# Patient Record
Sex: FEMALE | Race: WHITE | NOT HISPANIC OR LATINO
[De-identification: names, ages, dates, MRNs, and addresses within clinical notes are randomized per-mention and may not be internally consistent; named-entity substitution may affect disease eponyms.]

---

## 2017-05-22 ENCOUNTER — APPOINTMENT (OUTPATIENT)
Dept: OPHTHALMOLOGY | Facility: CLINIC | Age: 42
End: 2017-05-22

## 2017-05-22 DIAGNOSIS — H35.371 PUCKERING OF MACULA, RIGHT EYE: ICD-10-CM

## 2017-05-22 DIAGNOSIS — Z86.69 PERSONAL HISTORY OF OTHER DISEASES OF THE NERVOUS SYSTEM AND SENSE ORGANS: ICD-10-CM

## 2017-05-22 DIAGNOSIS — H43.21 CRYSTALLINE DEPOSITS IN VITREOUS BODY, RIGHT EYE: ICD-10-CM

## 2017-05-23 RX ORDER — DIFLUPREDNATE 0.5 MG/ML
0.05 EMULSION OPHTHALMIC
Qty: 1 | Refills: 4 | Status: ACTIVE | COMMUNITY
Start: 2017-05-23 | End: 1900-01-01

## 2017-07-18 ENCOUNTER — EMERGENCY (EMERGENCY)
Facility: HOSPITAL | Age: 42
LOS: 1 days | Discharge: HOME | End: 2017-07-18
Attending: EMERGENCY MEDICINE
Payer: COMMERCIAL

## 2017-07-18 VITALS
TEMPERATURE: 98 F | OXYGEN SATURATION: 100 % | SYSTOLIC BLOOD PRESSURE: 125 MMHG | DIASTOLIC BLOOD PRESSURE: 73 MMHG | RESPIRATION RATE: 18 BRPM | HEART RATE: 52 BPM

## 2017-07-18 VITALS
OXYGEN SATURATION: 98 % | TEMPERATURE: 98 F | RESPIRATION RATE: 18 BRPM | DIASTOLIC BLOOD PRESSURE: 54 MMHG | HEART RATE: 74 BPM | SYSTOLIC BLOOD PRESSURE: 119 MMHG

## 2017-07-18 LAB
ALBUMIN SERPL ELPH-MCNC: 3.8 G/DL — SIGNIFICANT CHANGE UP (ref 3.3–5)
ALP SERPL-CCNC: 88 U/L — SIGNIFICANT CHANGE UP (ref 40–120)
ALT FLD-CCNC: 12 U/L — SIGNIFICANT CHANGE UP (ref 4–33)
AST SERPL-CCNC: 12 U/L — SIGNIFICANT CHANGE UP (ref 4–32)
BASOPHILS # BLD AUTO: 0.03 K/UL — SIGNIFICANT CHANGE UP (ref 0–0.2)
BASOPHILS NFR BLD AUTO: 0.3 % — SIGNIFICANT CHANGE UP (ref 0–2)
BILIRUB SERPL-MCNC: 0.2 MG/DL — SIGNIFICANT CHANGE UP (ref 0.2–1.2)
BUN SERPL-MCNC: 11 MG/DL — SIGNIFICANT CHANGE UP (ref 7–23)
CALCIUM SERPL-MCNC: 9 MG/DL — SIGNIFICANT CHANGE UP (ref 8.4–10.5)
CHLORIDE SERPL-SCNC: 103 MMOL/L — SIGNIFICANT CHANGE UP (ref 98–107)
CK MB BLD-MCNC: 2.11 NG/ML — SIGNIFICANT CHANGE UP (ref 1–4.7)
CK SERPL-CCNC: 123 U/L — SIGNIFICANT CHANGE UP (ref 25–170)
CO2 SERPL-SCNC: 27 MMOL/L — SIGNIFICANT CHANGE UP (ref 22–31)
CREAT SERPL-MCNC: 0.59 MG/DL — SIGNIFICANT CHANGE UP (ref 0.5–1.3)
EOSINOPHIL # BLD AUTO: 0.14 K/UL — SIGNIFICANT CHANGE UP (ref 0–0.5)
EOSINOPHIL NFR BLD AUTO: 1.4 % — SIGNIFICANT CHANGE UP (ref 0–6)
GLUCOSE SERPL-MCNC: 262 MG/DL — HIGH (ref 70–99)
HCT VFR BLD CALC: 38.3 % — SIGNIFICANT CHANGE UP (ref 34.5–45)
HGB BLD-MCNC: 13.1 G/DL — SIGNIFICANT CHANGE UP (ref 11.5–15.5)
IMM GRANULOCYTES # BLD AUTO: 0.04 # — SIGNIFICANT CHANGE UP
IMM GRANULOCYTES NFR BLD AUTO: 0.4 % — SIGNIFICANT CHANGE UP (ref 0–1.5)
LIDOCAIN IGE QN: 26.9 U/L — SIGNIFICANT CHANGE UP (ref 7–60)
LYMPHOCYTES # BLD AUTO: 2.94 K/UL — SIGNIFICANT CHANGE UP (ref 1–3.3)
LYMPHOCYTES # BLD AUTO: 28.7 % — SIGNIFICANT CHANGE UP (ref 13–44)
MAGNESIUM SERPL-MCNC: 2 MG/DL — SIGNIFICANT CHANGE UP (ref 1.6–2.6)
MCHC RBC-ENTMCNC: 31.3 PG — SIGNIFICANT CHANGE UP (ref 27–34)
MCHC RBC-ENTMCNC: 34.2 % — SIGNIFICANT CHANGE UP (ref 32–36)
MCV RBC AUTO: 91.4 FL — SIGNIFICANT CHANGE UP (ref 80–100)
MONOCYTES # BLD AUTO: 0.49 K/UL — SIGNIFICANT CHANGE UP (ref 0–0.9)
MONOCYTES NFR BLD AUTO: 4.8 % — SIGNIFICANT CHANGE UP (ref 2–14)
NEUTROPHILS # BLD AUTO: 6.62 K/UL — SIGNIFICANT CHANGE UP (ref 1.8–7.4)
NEUTROPHILS NFR BLD AUTO: 64.4 % — SIGNIFICANT CHANGE UP (ref 43–77)
NRBC # FLD: 0 — SIGNIFICANT CHANGE UP
PLATELET # BLD AUTO: 242 K/UL — SIGNIFICANT CHANGE UP (ref 150–400)
PMV BLD: 10.3 FL — SIGNIFICANT CHANGE UP (ref 7–13)
POTASSIUM SERPL-MCNC: 4.5 MMOL/L — SIGNIFICANT CHANGE UP (ref 3.5–5.3)
POTASSIUM SERPL-SCNC: 4.5 MMOL/L — SIGNIFICANT CHANGE UP (ref 3.5–5.3)
PROT SERPL-MCNC: 6.6 G/DL — SIGNIFICANT CHANGE UP (ref 6–8.3)
RBC # BLD: 4.19 M/UL — SIGNIFICANT CHANGE UP (ref 3.8–5.2)
RBC # FLD: 12.4 % — SIGNIFICANT CHANGE UP (ref 10.3–14.5)
SODIUM SERPL-SCNC: 142 MMOL/L — SIGNIFICANT CHANGE UP (ref 135–145)
TROPONIN T SERPL-MCNC: < 0.06 NG/ML — SIGNIFICANT CHANGE UP (ref 0–0.06)
TROPONIN T SERPL-MCNC: < 0.06 NG/ML — SIGNIFICANT CHANGE UP (ref 0–0.06)
WBC # BLD: 10.26 K/UL — SIGNIFICANT CHANGE UP (ref 3.8–10.5)
WBC # FLD AUTO: 10.26 K/UL — SIGNIFICANT CHANGE UP (ref 3.8–10.5)

## 2017-07-18 PROCEDURE — 99285 EMERGENCY DEPT VISIT HI MDM: CPT

## 2017-07-18 PROCEDURE — 71020: CPT | Mod: 26

## 2017-07-18 RX ORDER — LIDOCAINE 4 G/100G
10 CREAM TOPICAL ONCE
Qty: 0 | Refills: 0 | Status: COMPLETED | OUTPATIENT
Start: 2017-07-18 | End: 2017-07-18

## 2017-07-18 RX ORDER — FAMOTIDINE 10 MG/ML
20 INJECTION INTRAVENOUS ONCE
Qty: 0 | Refills: 0 | Status: COMPLETED | OUTPATIENT
Start: 2017-07-18 | End: 2017-07-18

## 2017-07-18 RX ORDER — PANTOPRAZOLE SODIUM 20 MG/1
40 TABLET, DELAYED RELEASE ORAL ONCE
Qty: 0 | Refills: 0 | Status: COMPLETED | OUTPATIENT
Start: 2017-07-18 | End: 2017-07-18

## 2017-07-18 RX ADMIN — PANTOPRAZOLE SODIUM 40 MILLIGRAM(S): 20 TABLET, DELAYED RELEASE ORAL at 18:01

## 2017-07-18 RX ADMIN — LIDOCAINE 10 MILLILITER(S): 4 CREAM TOPICAL at 18:01

## 2017-07-18 RX ADMIN — Medication 30 MILLILITER(S): at 18:01

## 2017-07-18 RX ADMIN — FAMOTIDINE 20 MILLIGRAM(S): 10 INJECTION INTRAVENOUS at 15:08

## 2017-07-18 NOTE — ED ADULT NURSE NOTE - OBJECTIVE STATEMENT
Alert and oriented x 4. Pt received to intake spot 10c due to chest discomfort since this morning that increases with miovement. Pt deneis shortness of breath, nausea, vomiting, dizziness or diarrhea. IV 20g to right AC. Labs drawn. VSS. Will continue to monitor.

## 2017-07-18 NOTE — ED ADULT TRIAGE NOTE - CHIEF COMPLAINT QUOTE
p/t c/o of chest discomfort since this am pain increased with movement denies any other discomfort nad noted

## 2017-07-18 NOTE — ED PROVIDER NOTE - ATTENDING CONTRIBUTION TO CARE
40 yo F w/ hx of DM c/o chest pain since 3am constant since. Pain woke her up from sleep, localized to the left side, radiating to the left shoulder, constant, exertional, associated with mild SOB. Pain was burning in nature this morning now changed to pressure like.  no vomiting, no diaphoresis, no palpitations, no leg swelling, no hx of dvt/pe, no family hx of cad, non-smoker, no drug use.  pt stopped taking ppi since she stated was treated with h. pylori and told is gone now.  Pt also had a routine stress test which was normal last yr.  no trauma.    exam well appearing female in nad, hemodynamically stable.    will obtain labs, cxr, ekg, repeat trop and ekg, gi cocktail and po challenge.  likely has gastritis low cardiac risk.     pt eating in ed.  no active chest pain.  trops x 2 neg,  ekg repeat also unchanged sinus bradycardia at 55-59.  normal QTc 417 no hyperacute t wave, no st depression.  HEART score 2.  Low risk for acute ACS. cxr no cardiomegaly.  no consolidation or ptx.  PERC NEG   pt likely has worsening gastritis.  f/u with pcp, gi and cardio for outpt cardiac workup and gi for gastritis.  rec to take pepcid.  left ambulatory with family.

## 2017-07-18 NOTE — ED ADULT NURSE REASSESSMENT NOTE - NS ED NURSE REASSESS COMMENT FT1
Pt d/catherine home, with instruction by MD Cole.  Verbalized understanding.  Repeat troponin negative.  EKG-no change.  IV removed.  Left stable.

## 2017-07-18 NOTE — ED PROVIDER NOTE - OBJECTIVE STATEMENT
42 yo F w/ hx of DM c/o chest pain since 3am. Pain woke her up from sleep, localized to the left side, radiating to the left shoulder, constant, exertional, associated with mild SOB. Pain was burning in nature this morning, but feel more like a pressure now. Pt has past hx of GERD but not on medication at the moment. Denies any fever, chills, nausea, vomiting, coughing, recent travel or surgery, no leg swelling, no hx of DVT/PE. Pt has family hx of CAD. Denies hx of smoking. Pt was brought by ambulance, already got aspirin

## 2017-07-18 NOTE — ED PROVIDER NOTE - PROGRESS NOTE DETAILS
guillory: pt eating in ed.  no active chest pain.  trops x 2 neg,  ekg repeat also unchanged sinus bradycardia at 55-59.  normal QTc 417 no hyperacute t wave, no st depression.  HEART score 2.  Low risk for acute ACS. cxr no cardiomegaly.  no consolidation or ptx.  PERC NEG   pt likely has worsening gastritis.  f/u with pcp, gi and cardio for outpt cardiac workup and gi for gastritis.  rec to take pepcid.  left ambulatory with family

## 2017-07-18 NOTE — ED PROVIDER NOTE - MEDICAL DECISION MAKING DETAILS
42 yo F w/ hx of DM c/o chest pain since 3am. heart score is 3, PERC 0  - labs, chest x-ray, EKG 40 yo F w/ hx of DM c/o chest pain since 3am. heart score is 2, PERC 0  - labs, chest x-ray, EKG

## 2017-11-29 ENCOUNTER — OUTPATIENT (OUTPATIENT)
Dept: OUTPATIENT SERVICES | Facility: HOSPITAL | Age: 42
LOS: 1 days | End: 2017-11-29
Payer: COMMERCIAL

## 2017-11-29 ENCOUNTER — RESULT REVIEW (OUTPATIENT)
Age: 42
End: 2017-11-29

## 2017-11-29 ENCOUNTER — TRANSCRIPTION ENCOUNTER (OUTPATIENT)
Age: 42
End: 2017-11-29

## 2017-11-29 VITALS
HEART RATE: 99 BPM | DIASTOLIC BLOOD PRESSURE: 47 MMHG | RESPIRATION RATE: 12 BRPM | OXYGEN SATURATION: 97 % | TEMPERATURE: 99 F | HEIGHT: 66 IN | WEIGHT: 185.63 LBS | SYSTOLIC BLOOD PRESSURE: 108 MMHG

## 2017-11-29 VITALS
DIASTOLIC BLOOD PRESSURE: 58 MMHG | HEART RATE: 99 BPM | SYSTOLIC BLOOD PRESSURE: 100 MMHG | OXYGEN SATURATION: 97 % | RESPIRATION RATE: 16 BRPM

## 2017-11-29 DIAGNOSIS — B58.01 TOXOPLASMA CHORIORETINITIS: ICD-10-CM

## 2017-11-29 DIAGNOSIS — Z98.41 CATARACT EXTRACTION STATUS, RIGHT EYE: Chronic | ICD-10-CM

## 2017-11-29 LAB
GLUCOSE BLDC GLUCOMTR-MCNC: 288 MG/DL — HIGH (ref 70–99)
GRAM STN FLD: SIGNIFICANT CHANGE UP
HCG UR QL: NEGATIVE — SIGNIFICANT CHANGE UP
LABORATORY COMMENT REPORT: SIGNIFICANT CHANGE UP
SOURCE HSV 1/2: SIGNIFICANT CHANGE UP
SPECIMEN SOURCE: SIGNIFICANT CHANGE UP

## 2017-11-29 PROCEDURE — 87799 DETECT AGENT NOS DNA QUANT: CPT

## 2017-11-29 PROCEDURE — 87070 CULTURE OTHR SPECIMN AEROBIC: CPT

## 2017-11-29 PROCEDURE — 36415 COLL VENOUS BLD VENIPUNCTURE: CPT

## 2017-11-29 PROCEDURE — 87205 SMEAR GRAM STAIN: CPT

## 2017-11-29 PROCEDURE — 88305 TISSUE EXAM BY PATHOLOGIST: CPT

## 2017-11-29 PROCEDURE — 88108 CYTOPATH CONCENTRATE TECH: CPT

## 2017-11-29 PROCEDURE — 82962 GLUCOSE BLOOD TEST: CPT

## 2017-11-29 PROCEDURE — 88305 TISSUE EXAM BY PATHOLOGIST: CPT | Mod: 26

## 2017-11-29 PROCEDURE — 81025 URINE PREGNANCY TEST: CPT

## 2017-11-29 PROCEDURE — 87102 FUNGUS ISOLATION CULTURE: CPT

## 2017-11-29 PROCEDURE — 67039 LASER TREATMENT OF RETINA: CPT | Mod: RT

## 2017-11-29 PROCEDURE — C1889: CPT

## 2017-11-29 PROCEDURE — 87075 CULTR BACTERIA EXCEPT BLOOD: CPT

## 2017-11-29 PROCEDURE — 88108 CYTOPATH CONCENTRATE TECH: CPT | Mod: 26

## 2017-11-29 PROCEDURE — 87529 HSV DNA AMP PROBE: CPT

## 2017-11-29 NOTE — ASU DISCHARGE PLAN (ADULT/PEDIATRIC). - NOTIFY
Fever greater than 101/Inability to Tolerate Liquids or Foods/Bleeding that does not stop/Pain not relieved by Medications/Persistent Nausea and Vomiting

## 2017-12-01 LAB — NON-GYN CYTOLOGY SPEC: SIGNIFICANT CHANGE UP

## 2017-12-04 LAB
CULTURE RESULTS: SIGNIFICANT CHANGE UP
MISCELLANEOUS TEST NAME: SIGNIFICANT CHANGE UP
SPECIMEN SOURCE: SIGNIFICANT CHANGE UP

## 2017-12-13 LAB
CULTURE RESULTS: SIGNIFICANT CHANGE UP
SPECIMEN SOURCE: SIGNIFICANT CHANGE UP

## 2017-12-30 LAB
CULTURE RESULTS: SIGNIFICANT CHANGE UP
SPECIMEN SOURCE: SIGNIFICANT CHANGE UP

## 2018-10-26 ENCOUNTER — EMERGENCY (EMERGENCY)
Facility: HOSPITAL | Age: 43
LOS: 1 days | Discharge: HOME | End: 2018-10-26
Attending: EMERGENCY MEDICINE | Admitting: PERSONAL EMERGENCY RESPONSE ATTENDANT
Payer: MEDICAID

## 2018-10-26 VITALS
OXYGEN SATURATION: 100 % | TEMPERATURE: 98 F | SYSTOLIC BLOOD PRESSURE: 130 MMHG | HEART RATE: 95 BPM | DIASTOLIC BLOOD PRESSURE: 70 MMHG | RESPIRATION RATE: 16 BRPM

## 2018-10-26 DIAGNOSIS — Z98.41 CATARACT EXTRACTION STATUS, RIGHT EYE: Chronic | ICD-10-CM

## 2018-10-26 PROCEDURE — 99283 EMERGENCY DEPT VISIT LOW MDM: CPT | Mod: 25

## 2018-10-26 RX ORDER — ACETAMINOPHEN 500 MG
650 TABLET ORAL ONCE
Qty: 0 | Refills: 0 | Status: COMPLETED | OUTPATIENT
Start: 2018-10-26 | End: 2018-10-27

## 2018-10-26 RX ORDER — LIDOCAINE 4 G/100G
2 CREAM TOPICAL ONCE
Qty: 0 | Refills: 0 | Status: COMPLETED | OUTPATIENT
Start: 2018-10-26 | End: 2018-10-26

## 2018-10-26 RX ORDER — DIAZEPAM 5 MG
5 TABLET ORAL ONCE
Qty: 0 | Refills: 0 | Status: DISCONTINUED | OUTPATIENT
Start: 2018-10-26 | End: 2018-10-26

## 2018-10-26 RX ORDER — IBUPROFEN 200 MG
600 TABLET ORAL ONCE
Qty: 0 | Refills: 0 | Status: COMPLETED | OUTPATIENT
Start: 2018-10-26 | End: 2018-10-26

## 2018-10-26 NOTE — ED ADULT NURSE NOTE - NSIMPLEMENTINTERV_GEN_ALL_ED
Implemented All Fall with Harm Risk Interventions:  [place] to call system. Call bell, personal items and telephone within reach. Instruct patient to call for assistance. Room bathroom lighting operational. Non-slip footwear when patient is off stretcher. Physically safe environment: no spills, clutter or unnecessary equipment. Stretcher in lowest position, wheels locked, appropriate side rails in place. Provide visual cue, wrist band, yellow gown, etc. Monitor gait and stability. Monitor for mental status changes and reorient to person, place, and time. Review medications for side effects contributing to fall risk. Reinforce activity limits and safety measures with patient and family. Provide visual clues: red socks.

## 2018-10-26 NOTE — ED ADULT NURSE NOTE - OBJECTIVE STATEMENT
Richard RN: Patient received in trauma C, 41 y/o female a&ox4 p/w a c/c of acute onset atraumatic midthoracic spinal pain that began last Saturday.  Patient reports the pain worsens with movement and remits slightly with rest.  Endorses the pain as radiating to left buttock and down left leg, denying diminished sensation in LLE.  Distal pulse, motor and sensory intact in LLE.  Patient reported she developed diminished sensation in left hand this AM.  Patient reports she was seen by a pain management MD who gave her two lidocaine injections with no relief.  Patient also reporting midsternal chest pain with exhalation and dizziness that began today.  Denies N/V/D, fevers, abd pain, GI/ symptoms.  Respirations unlabored, patient able to speak in full sentences.  Negative pronator drift, negative slurred speech, negative limb ataxia, negative facial droop.  Patient waiting to be seen by MD.

## 2018-10-26 NOTE — ED ADULT NURSE NOTE - CHIEF COMPLAINT QUOTE
Pt amb to triage. Pt st" I have back pain mid spine travels into left hip since last week , I did not fall don't know why I have this pain in mid spine...I went to pain management MD I have hx of neck problems....he gave me Lidocaine injection....did not help at all." Pt st" I have a numbness in only left hand since this am....I have had numbness in hands related to neck problems but since this morning the left hand feels more numb. " " I have no numbness in legs." Denies parathesia of lower ext denies incontinence. + fatuma  hand grasp = strength, fingers warm, + brisk cap refill

## 2018-10-26 NOTE — ED ADULT TRIAGE NOTE - CHIEF COMPLAINT QUOTE
Pt st" I have back pain mid spine travels into left hip since last week , I did not fall don't know why I have this pain in mid spine...I went to pain management MD I have hx of neck problems....he gave me Lidocaine injection....did not help at all." Pt amb to triage. Pt st" I have back pain mid spine travels into left hip since last week , I did not fall don't know why I have this pain in mid spine...I went to pain management MD I have hx of neck problems....he gave me Lidocaine injection....did not help at all." Pt st" I have a numbness in only left hand since this am....I have had numbness in hands related to neck problems but since this morning the left hand feels more numb. " " I have no numbness in legs." Denies parathesia of lower ext denies incontinence. + fatuma  hand grasp = strength, fingers warm, + brisk cap refill

## 2018-10-27 VITALS
HEART RATE: 82 BPM | DIASTOLIC BLOOD PRESSURE: 57 MMHG | RESPIRATION RATE: 18 BRPM | SYSTOLIC BLOOD PRESSURE: 109 MMHG | OXYGEN SATURATION: 99 % | TEMPERATURE: 98 F

## 2018-10-27 LAB
APPEARANCE UR: CLEAR — SIGNIFICANT CHANGE UP
BILIRUB UR-MCNC: NEGATIVE — SIGNIFICANT CHANGE UP
BLOOD UR QL VISUAL: NEGATIVE — SIGNIFICANT CHANGE UP
COLOR SPEC: YELLOW — SIGNIFICANT CHANGE UP
GLUCOSE UR-MCNC: >1000 — HIGH
KETONES UR-MCNC: NEGATIVE — SIGNIFICANT CHANGE UP
LEUKOCYTE ESTERASE UR-ACNC: NEGATIVE — SIGNIFICANT CHANGE UP
NITRITE UR-MCNC: NEGATIVE — SIGNIFICANT CHANGE UP
PH UR: 6 — SIGNIFICANT CHANGE UP (ref 5–8)
PROT UR-MCNC: NEGATIVE — SIGNIFICANT CHANGE UP
SP GR SPEC: 1.03 — SIGNIFICANT CHANGE UP (ref 1–1.04)
UROBILINOGEN FLD QL: NORMAL — SIGNIFICANT CHANGE UP

## 2018-10-27 RX ORDER — DIAZEPAM 5 MG
1 TABLET ORAL
Qty: 3 | Refills: 0
Start: 2018-10-27 | End: 2018-10-29

## 2018-10-27 RX ADMIN — Medication 5 MILLIGRAM(S): at 00:38

## 2018-10-27 RX ADMIN — Medication 650 MILLIGRAM(S): at 00:39

## 2018-10-27 RX ADMIN — Medication 600 MILLIGRAM(S): at 00:38

## 2018-10-27 RX ADMIN — LIDOCAINE 2 PATCH: 4 CREAM TOPICAL at 00:39

## 2018-10-27 NOTE — ED PROVIDER NOTE - OBJECTIVE STATEMENT
42F presenting with back pain, worse with movement and better with rest x 5d. No focal deficits, no direct trauma, no hx of CA. Pt also denies any bowel or urinary rentention or incontinence. No numbness in anus. Walking well in ED.

## 2018-10-27 NOTE — ED PROVIDER NOTE - PROGRESS NOTE DETAILS
Attending MD Mccallum.  Pt signed out to me in stable condition pending reassessment and likely d/c. Pt is a 43 yo female with hx of chronic neck and back pain.  Presented today stating it is ‘different back pain’.  Pt has pain bilaterally R>L that is midline ~T2-T3 down midline with bilateral paraspinal muscle spasm throughout thoracic region.  No focal area of TTP/midline TTP/stepoffs.  Planned U/A for R CVA TTP however no urinary sxs/fevers/n/v.  Pt has chronic back pain that is exacerbated.  Planned lido patches, ibu profen, valium, tylenol.  Pain does not radiate to chest.  No other red flag sxs at this time.  Pt had a L lidocaine injection performed by pain management specialist, poss trigger point injection.  States that current pain is worse on R than L.  No fevers/chills/loss of bowel/bladder continence.  Plan to reassess and provide with follow-up instructions, return precautions if improved.  No current neuro deficits at time of signout. Attending MD Mccallum.  Pt endorsing sig improvement.  Continues to have no radiation to chest/abdomen.  VS's remain stable.  Pt advised of need to return to ED for fevers/chills/new/worsening pain/development of SOB, syncope.  Follow-up with outpt provider for ongoing management.  Pt verbalizes understanding of above return precautions and follow-up plan.

## 2018-10-27 NOTE — ED PROVIDER NOTE - ATTENDING CONTRIBUTION TO CARE
LESIA EDWADRS  ATTENDING, MD: 41 yo F presenting with a history of chronic neck pain presents with back pain for the past 5 days, worsened with movement.  Patient is ambulatory in ED without assistance. Patient denies any pertinent past medical history.  Denies IVDA or h/o CA.  Patient denies F/C, cough, abd pain, N/V/D/C, weakness, dizziness, urinary symptoms, extremity pain or swelling or other complaints.   Specifically denies saddle anesthesia.  Denies BI/BI.  No h/o trauma.  Patient states she saw her pain management doctor and got a shot on the LEFT, even though she states the pain has always been greater on the RIGHT.  She was told it would cross over.  No epidural injection performed.      PHYSICAL EXAM:    GENERAL: Patient is awake and alert and in no acute distress.  Non-toxic appearing.  A+Ox4  HEAD:  Airway patent.  No oropharyngeal edema.  No stridor.  Auricles are normal.    EYES: EOM grossly intact, conjunctiva non-injected and sclera clear  NECK: Supple, No vertebral point tenderness to palpation.  CHEST/LUNG: Lungs clear to auscultation bilaterally; no wheeze, no rhonchi,  no rales.    HEART: Regular rate and rhythm;   ABDOMEN: Soft, non-tender to palpation.  No rebound/no guarding.  Bowel sounds present x 4.   MSK/EXTREMITIES: No clubbing or cyanosis. Back is tender +paraspinal greatest in the thoracic region R>L with no vertebral point tenderness to palpation, no CVAT.  Moving all 4 extremities.     NEURO: Neurologically grossly intact.   No obvious deficits.   PSYCH: Psychiatrically normal mood and affect.  No apparent risk to self or others.       DR. EDWARDS, ATTENDING MD:    I performed a face to face bedside interview with patient regarding history of present illness, review of symptoms and past medical history. I completed an independent physical exam.  I have discussed patient's plan of care with the team of health care providers.   I agree with note as stated above, having amended the EMR as needed to reflect my findings. I have discussed the assessment and plan of care.  This includes during the time I functioned as the attending physician for this patient. LESIA EDWARDS  ATTENDING, MD: 43 yo F presenting with a history of chronic neck pain presents with back pain for the past 5 days, worsened with movement.  Patient is ambulatory in ED without assistance. .  Denies IVDA or h/o CA.  Patient denies F/C, cough, abd pain, N/V/D/C, weakness, dizziness, urinary symptoms, extremity pain or swelling or other complaints.   Specifically denies saddle anesthesia.  Denies BI/BI.  No h/o trauma.  Patient states she saw her pain management doctor and got a shot on the LEFT, even though she states the pain has always been greater on the RIGHT.  She was told it would cross over.  No epidural injection performed.      PHYSICAL EXAM:    GENERAL: Patient is awake and alert and in no acute distress.  Non-toxic appearing.  A+Ox4  HEAD:  Airway patent.  No oropharyngeal edema.  No stridor.  Auricles are normal.    EYES: EOM grossly intact, conjunctiva non-injected and sclera clear  NECK: Supple, No vertebral point tenderness to palpation.  CHEST/LUNG: Lungs clear to auscultation bilaterally; no wheeze, no rhonchi,  no rales.    HEART: Regular rate and rhythm;   ABDOMEN: Soft, non-tender to palpation.  No rebound/no guarding.  Bowel sounds present x 4.   MSK/EXTREMITIES: No clubbing or cyanosis. Back is tender +paraspinal greatest in the thoracic region R>L with no vertebral point tenderness to palpation, no CVAT.  Moving all 4 extremities.     NEURO: Neurologically grossly intact.   No obvious deficits.   PSYCH: Psychiatrically normal mood and affect.  No apparent risk to self or others.       DR. EDWARDS, ATTENDING MD:    I performed a face to face bedside interview with patient regarding history of present illness, review of symptoms and past medical history. I completed an independent physical exam.  I have discussed patient's plan of care with the team of health care providers.   I agree with note as stated above, having amended the EMR as needed to reflect my findings. I have discussed the assessment and plan of care.  This includes during the time I functioned as the attending physician for this patient.

## 2019-02-07 NOTE — ASU PATIENT PROFILE, ADULT - NS PRO ABUSE SCREEN AFRAID ANYONE YN
Detail Level: Zone Continue Regimen: Ketoconazole shampoo biw, pt advised while flared to wash more often and alternate rx with otc shampoos Otc Regimen: Dhs clear shampoo and conditioners Continue Regimen: Clobetasol cream twice daily for two weeks on, one week off repeat prn Continue Regimen: Pt admits to intermittent use of ketoconazole cream (discussed use should be every day)\\nKetoconazole cr bid every day x 1 month no

## 2019-08-02 ENCOUNTER — EMERGENCY (EMERGENCY)
Facility: HOSPITAL | Age: 44
LOS: 1 days | Discharge: HOME | End: 2019-08-02
Admitting: EMERGENCY MEDICINE
Payer: MEDICAID

## 2019-08-02 VITALS
SYSTOLIC BLOOD PRESSURE: 114 MMHG | HEART RATE: 89 BPM | DIASTOLIC BLOOD PRESSURE: 70 MMHG | OXYGEN SATURATION: 97 % | TEMPERATURE: 98 F

## 2019-08-02 DIAGNOSIS — Z98.41 CATARACT EXTRACTION STATUS, RIGHT EYE: Chronic | ICD-10-CM

## 2019-08-02 PROCEDURE — 99283 EMERGENCY DEPT VISIT LOW MDM: CPT

## 2019-08-02 RX ORDER — OFLOXACIN 0.3 %
2 DROPS OPHTHALMIC (EYE)
Qty: 10 | Refills: 0
Start: 2019-08-02 | End: 2019-08-08

## 2019-08-02 NOTE — ED PROVIDER NOTE - PLAN OF CARE
Advance activity as tolerated.  Continue all previously prescribed medications as directed unless otherwise instructed.  Apply Ocuflox two drops to affected four times a day for 1 week.  Clean eyelid margin with face cloth, tepid water and baby shampoo twice daily.  Follow up with your primary care physician and ophthalmology clinic ([address] [place] [address], [address], [place], [place] ([phone])) in 48-72 hours- bring copies of your results.  Return to the ER for worsening or persistent symptoms, including but not limited to worsening/persistent pain, blurred vision, vision loss, pain with eye movement, sensitivity to light, and/or ANY NEW OR CONCERNING SYMPTOMS. If you have issues obtaining follow up, please call: [phone] to obtain a doctor or specialist who takes your insurance in your area.  You may call [phone] to make an appointment with the internal medicine clinic.

## 2019-08-02 NOTE — ED ADULT TRIAGE NOTE - CHIEF COMPLAINT QUOTE
C/O left eye pain, discharge, blurry vison to eye x 2 weeks. Went to opthomologist, was told has a virus to eye, given Flarex eye drops, states eye "was cleaned by doctor" pain worsened since. Redness to sclera and dried crust observed to left eye. Denies hx glaucoma. Hx DM. . NAD in triage.

## 2019-08-02 NOTE — ED PROVIDER NOTE - NSFOLLOWUPINSTRUCTIONS_ED_ALL_ED_FT
Advance activity as tolerated.  Continue all previously prescribed medications as directed unless otherwise instructed.  Apply Ocuflox two drops to affected four times a day for 1 week.  Clean eyelid margin with face cloth, tepid water and baby shampoo twice daily.  Follow up with your primary care physician and ophthalmology clinic ([address] [place] [address], [address], [place], [place] ([phone])) in 48-72 hours- bring copies of your results.  Return to the ER for worsening or persistent symptoms, including but not limited to worsening/persistent pain, blurred vision, vision loss, pain with eye movement, sensitivity to light, and/or ANY NEW OR CONCERNING SYMPTOMS. If you have issues obtaining follow up, please call: [phone] to obtain a doctor or specialist who takes your insurance in your area.  You may call [phone] to make an appointment with the internal medicine clinic.

## 2019-08-02 NOTE — ED PROVIDER NOTE - CARE PLAN
Principal Discharge DX:	Conjunctivitis  Assessment and plan of treatment:	Advance activity as tolerated.  Continue all previously prescribed medications as directed unless otherwise instructed.  Apply Ocuflox two drops to affected four times a day for 1 week.  Clean eyelid margin with face cloth, tepid water and baby shampoo twice daily.  Follow up with your primary care physician and ophthalmology clinic ([address] [place] [address] [address], [place], [place] ([phone])) in 48-72 hours- bring copies of your results.  Return to the ER for worsening or persistent symptoms, including but not limited to worsening/persistent pain, blurred vision, vision loss, pain with eye movement, sensitivity to light, and/or ANY NEW OR CONCERNING SYMPTOMS. If you have issues obtaining follow up, please call: [phone] to obtain a doctor or specialist who takes your insurance in your area.  You may call [phone] to make an appointment with the internal medicine clinic.

## 2019-08-02 NOTE — ED PROVIDER NOTE - OBJECTIVE STATEMENT
Pt is a 44 y/o F PMHx anxiety, DM, HLD p/w left eye pain x three weeks. Pt notes recent sick contact with family member with "eye infection" after which pt developed gradual onset left eye redness and discharge.  Pt notes 2/2 discharge pt would develop intermittent left eye blurred vision.  Two weeks ago pt states she saw ophthalmologist who treated pt with Flarex, with which pt has been compliant without any improvement in symptoms.  Pt notes she returned to ophthalmologist office who "cleaned eyelids" by removing discharge that had crusted onto eyelids.  Pt denies any fevers, chills, headaches, double vision, vision loss, pain with eye movement, diplopia, eye trauma, use of contact lenses, or any other specific complaints.

## 2019-08-02 NOTE — ED PROVIDER NOTE - CLINICAL SUMMARY MEDICAL DECISION MAKING FREE TEXT BOX
Pt is a 44 y/o F PMHx anxiety, DM, HLD p/w left eye pain x three weeks -- likely conjunctivitis, no clinical e/o corneal ulcer/abrasion, no e/o septal or preseptal cellulitis -- ocuflox, ophtho follow up

## 2019-08-02 NOTE — ED PROVIDER NOTE - PROGRESS NOTE DETAILS
FRANKY GARCIA:  Pt notes resolution of pain after tetracaine administration.  Pt medically stable for discharge. Pt to follow up with ophthalmology.  Strict return precautions given.

## 2022-03-17 ENCOUNTER — EMERGENCY (EMERGENCY)
Facility: HOSPITAL | Age: 47
LOS: 1 days | Discharge: HOME | End: 2022-03-17
Attending: STUDENT IN AN ORGANIZED HEALTH CARE EDUCATION/TRAINING PROGRAM | Admitting: STUDENT IN AN ORGANIZED HEALTH CARE EDUCATION/TRAINING PROGRAM
Payer: MEDICAID

## 2022-03-17 VITALS
SYSTOLIC BLOOD PRESSURE: 140 MMHG | TEMPERATURE: 98 F | OXYGEN SATURATION: 100 % | HEART RATE: 96 BPM | DIASTOLIC BLOOD PRESSURE: 84 MMHG | RESPIRATION RATE: 16 BRPM | HEIGHT: 66 IN

## 2022-03-17 VITALS
HEART RATE: 78 BPM | RESPIRATION RATE: 16 BRPM | DIASTOLIC BLOOD PRESSURE: 76 MMHG | SYSTOLIC BLOOD PRESSURE: 125 MMHG | OXYGEN SATURATION: 100 %

## 2022-03-17 DIAGNOSIS — Z98.41 CATARACT EXTRACTION STATUS, RIGHT EYE: Chronic | ICD-10-CM

## 2022-03-17 LAB
ALBUMIN SERPL ELPH-MCNC: 4.3 G/DL — SIGNIFICANT CHANGE UP (ref 3.3–5)
ALP SERPL-CCNC: 85 U/L — SIGNIFICANT CHANGE UP (ref 40–120)
ALT FLD-CCNC: 9 U/L — SIGNIFICANT CHANGE UP (ref 4–33)
ANION GAP SERPL CALC-SCNC: 12 MMOL/L — SIGNIFICANT CHANGE UP (ref 7–14)
APPEARANCE UR: CLEAR — SIGNIFICANT CHANGE UP
AST SERPL-CCNC: 10 U/L — SIGNIFICANT CHANGE UP (ref 4–32)
BACTERIA # UR AUTO: NEGATIVE — SIGNIFICANT CHANGE UP
BASOPHILS # BLD AUTO: 0.06 K/UL — SIGNIFICANT CHANGE UP (ref 0–0.2)
BASOPHILS NFR BLD AUTO: 0.7 % — SIGNIFICANT CHANGE UP (ref 0–2)
BILIRUB SERPL-MCNC: 0.2 MG/DL — SIGNIFICANT CHANGE UP (ref 0.2–1.2)
BILIRUB UR-MCNC: NEGATIVE — SIGNIFICANT CHANGE UP
BUN SERPL-MCNC: 15 MG/DL — SIGNIFICANT CHANGE UP (ref 7–23)
CALCIUM SERPL-MCNC: 9 MG/DL — SIGNIFICANT CHANGE UP (ref 8.4–10.5)
CHLORIDE SERPL-SCNC: 103 MMOL/L — SIGNIFICANT CHANGE UP (ref 98–107)
CO2 SERPL-SCNC: 23 MMOL/L — SIGNIFICANT CHANGE UP (ref 22–31)
COLOR SPEC: YELLOW — SIGNIFICANT CHANGE UP
CREAT SERPL-MCNC: 0.6 MG/DL — SIGNIFICANT CHANGE UP (ref 0.5–1.3)
DIFF PNL FLD: NEGATIVE — SIGNIFICANT CHANGE UP
EGFR: 112 ML/MIN/1.73M2 — SIGNIFICANT CHANGE UP
EOSINOPHIL # BLD AUTO: 0.18 K/UL — SIGNIFICANT CHANGE UP (ref 0–0.5)
EOSINOPHIL NFR BLD AUTO: 2.1 % — SIGNIFICANT CHANGE UP (ref 0–6)
GLUCOSE SERPL-MCNC: 314 MG/DL — HIGH (ref 70–99)
GLUCOSE UR QL: ABNORMAL
HCG SERPL-ACNC: <5 MIU/ML — SIGNIFICANT CHANGE UP
HCT VFR BLD CALC: 38.6 % — SIGNIFICANT CHANGE UP (ref 34.5–45)
HGB BLD-MCNC: 12.7 G/DL — SIGNIFICANT CHANGE UP (ref 11.5–15.5)
IANC: 4.2 K/UL — SIGNIFICANT CHANGE UP (ref 1.5–8.5)
IMM GRANULOCYTES NFR BLD AUTO: 0.4 % — SIGNIFICANT CHANGE UP (ref 0–1.5)
KETONES UR-MCNC: NEGATIVE — SIGNIFICANT CHANGE UP
LEUKOCYTE ESTERASE UR-ACNC: NEGATIVE — SIGNIFICANT CHANGE UP
LIDOCAIN IGE QN: 25 U/L — SIGNIFICANT CHANGE UP (ref 7–60)
LYMPHOCYTES # BLD AUTO: 3.58 K/UL — HIGH (ref 1–3.3)
LYMPHOCYTES # BLD AUTO: 42 % — SIGNIFICANT CHANGE UP (ref 13–44)
MCHC RBC-ENTMCNC: 30.2 PG — SIGNIFICANT CHANGE UP (ref 27–34)
MCHC RBC-ENTMCNC: 32.9 GM/DL — SIGNIFICANT CHANGE UP (ref 32–36)
MCV RBC AUTO: 91.7 FL — SIGNIFICANT CHANGE UP (ref 80–100)
MONOCYTES # BLD AUTO: 0.48 K/UL — SIGNIFICANT CHANGE UP (ref 0–0.9)
MONOCYTES NFR BLD AUTO: 5.6 % — SIGNIFICANT CHANGE UP (ref 2–14)
NEUTROPHILS # BLD AUTO: 4.2 K/UL — SIGNIFICANT CHANGE UP (ref 1.8–7.4)
NEUTROPHILS NFR BLD AUTO: 49.2 % — SIGNIFICANT CHANGE UP (ref 43–77)
NITRITE UR-MCNC: NEGATIVE — SIGNIFICANT CHANGE UP
NRBC # BLD: 0 /100 WBCS — SIGNIFICANT CHANGE UP
NRBC # FLD: 0 K/UL — SIGNIFICANT CHANGE UP
PH UR: 6 — SIGNIFICANT CHANGE UP (ref 5–8)
PLATELET # BLD AUTO: 253 K/UL — SIGNIFICANT CHANGE UP (ref 150–400)
POTASSIUM SERPL-MCNC: 4.4 MMOL/L — SIGNIFICANT CHANGE UP (ref 3.5–5.3)
POTASSIUM SERPL-SCNC: 4.4 MMOL/L — SIGNIFICANT CHANGE UP (ref 3.5–5.3)
PROT SERPL-MCNC: 7 G/DL — SIGNIFICANT CHANGE UP (ref 6–8.3)
PROT UR-MCNC: ABNORMAL
RBC # BLD: 4.21 M/UL — SIGNIFICANT CHANGE UP (ref 3.8–5.2)
RBC # FLD: 12.2 % — SIGNIFICANT CHANGE UP (ref 10.3–14.5)
RBC CASTS # UR COMP ASSIST: 2 /HPF — SIGNIFICANT CHANGE UP (ref 0–4)
SODIUM SERPL-SCNC: 138 MMOL/L — SIGNIFICANT CHANGE UP (ref 135–145)
SP GR SPEC: 1.04 — SIGNIFICANT CHANGE UP (ref 1–1.05)
UROBILINOGEN FLD QL: SIGNIFICANT CHANGE UP
WBC # BLD: 8.53 K/UL — SIGNIFICANT CHANGE UP (ref 3.8–10.5)
WBC # FLD AUTO: 8.53 K/UL — SIGNIFICANT CHANGE UP (ref 3.8–10.5)
WBC UR QL: 2 /HPF — SIGNIFICANT CHANGE UP (ref 0–5)

## 2022-03-17 PROCEDURE — 99285 EMERGENCY DEPT VISIT HI MDM: CPT

## 2022-03-17 RX ORDER — KETOROLAC TROMETHAMINE 30 MG/ML
30 SYRINGE (ML) INJECTION ONCE
Refills: 0 | Status: DISCONTINUED | OUTPATIENT
Start: 2022-03-17 | End: 2022-03-17

## 2022-03-17 RX ORDER — SODIUM CHLORIDE 9 MG/ML
1000 INJECTION INTRAMUSCULAR; INTRAVENOUS; SUBCUTANEOUS ONCE
Refills: 0 | Status: COMPLETED | OUTPATIENT
Start: 2022-03-17 | End: 2022-03-17

## 2022-03-17 RX ADMIN — SODIUM CHLORIDE 1000 MILLILITER(S): 9 INJECTION INTRAMUSCULAR; INTRAVENOUS; SUBCUTANEOUS at 23:39

## 2022-03-17 RX ADMIN — Medication 30 MILLIGRAM(S): at 23:40

## 2022-03-17 NOTE — ED PROVIDER NOTE - NS ED ROS FT
CONSTITUTIONAL: No fevers, no chills, no lightheadedness, no dizziness  EYES: no visual changes, no eye pain  EARS: no ear drainage, no ear pain, no change in hearing  NOSE: no nasal congestion  MOUTH/THROAT: no sore throat  CV: No chest pain, no palpitations  RESP: No SOB, no cough  GI: No v/d  :  no hematuria  MSK: no back pain, no extremity pain  SKIN: no rashes  NEURO: no headache, no focal weakness, no decreased sensation/paresthesias

## 2022-03-17 NOTE — ED ADULT TRIAGE NOTE - CHIEF COMPLAINT QUOTE
Patient c/o R flank pain for three days. Endorses urinary urgency/frequency, denies hematuria.  Endorses nausea, no vomiting. Took Advil at 7pm with no relief. PMH DM.

## 2022-03-17 NOTE — ED PROVIDER NOTE - PHYSICAL EXAMINATION
VITALS: Initial triage and subsequent vitals have been reviewed by me.  Gen: Well appearing, NAD, alert, non-toxic  Head: NCAT  HEENT: MMM, normal conjunctiva, anicteric, neck supple  Lung: CTAB, no adventitious sounds  CV: RRR, no murmurs, 2+symmetric peripheral pulses  Abd: soft, NTND, no rebound or guarding, no palpable masses, +R CVAT  MSK: No edema, no visible deformities  Neuro: Moving all extremity grossly, following commands appropriately, fluid speech  Skin: Warm and dry, no evidence of rash  Psych: normal mood and affect

## 2022-03-17 NOTE — ED ADULT NURSE NOTE - OBJECTIVE STATEMENT
Received in intake with c/o right flank pain and burning micturition for the last 3 days. c/o nausea too, but denies vomiting. Not in acute distress. alert and oriented x 4, safety maintained. Able to ambulate independently. 20 G IV line inserted in right AC. all due labs sent.

## 2022-03-17 NOTE — ED PROVIDER NOTE - PROGRESS NOTE DETAILS
Pt feeling better. Abd pain has resolved. Abd soft non tender. Pt tolerating PO. Labs unremarkable. Imaging negative, CTAP negative for acute pahtology however shows L adrenal lesion, pt made aware, given results, advised followup with pcp. If any worsening symptoms return to Emergency Department immediately. Pt verbalizes agreement and understanding. VSS.

## 2022-03-17 NOTE — ED PROVIDER NOTE - OBJECTIVE STATEMENT
47yo F hx DM p/w low back pain worse on R radiating around to R abd worsening x 3 days. Sx not colicky, no hx of stones. Assc w/ dysuria and nausea. No fever, chills, v/d, cp, sob

## 2022-03-17 NOTE — ED PROVIDER NOTE - NS ED ATTENDING STATEMENT MOD
This was a shared visit with the IVONNE. I reviewed and verified the documentation and independently performed the documented:

## 2022-03-17 NOTE — ED PROVIDER NOTE - PATIENT PORTAL LINK FT
You can access the FollowMyHealth Patient Portal offered by [facility] by registering at the following website: [email or web address]. By joining [facility]’s FollowMyHealth portal, you will also be able to view your health information using other applications (apps) compatible with our system.

## 2022-03-17 NOTE — ED PROVIDER NOTE - CLINICAL SUMMARY MEDICAL DECISION MAKING FREE TEXT BOX
Flank pain w/ dysuria concern for poss pyelo vs stone. Will check labs, ua, CT stone hunt and reassess.

## 2022-03-18 PROCEDURE — 74177 CT ABD & PELVIS W/CONTRAST: CPT | Mod: 26,MA

## 2022-03-20 LAB
CULTURE RESULTS: SIGNIFICANT CHANGE UP
SPECIMEN SOURCE: SIGNIFICANT CHANGE UP

## 2022-06-06 ENCOUNTER — APPOINTMENT (OUTPATIENT)
Dept: MRI IMAGING | Facility: CLINIC | Age: 47
End: 2022-06-06
Payer: MEDICAID

## 2022-06-06 PROCEDURE — 74183 MRI ABD W/O CNTR FLWD CNTR: CPT

## 2022-06-06 PROCEDURE — A9585: CPT

## 2022-07-18 ENCOUNTER — APPOINTMENT (OUTPATIENT)
Dept: SURGERY | Facility: CLINIC | Age: 47
End: 2022-07-18

## 2022-07-18 PROCEDURE — 99204 OFFICE O/P NEW MOD 45 MIN: CPT

## 2022-07-18 RX ORDER — DEXAMETHASONE 1 MG/1
1 TABLET ORAL
Qty: 1 | Refills: 0 | Status: ACTIVE | COMMUNITY
Start: 2022-07-18 | End: 1900-01-01

## 2022-07-18 NOTE — PHYSICAL EXAM
[Respiratory Effort] : normal respiratory effort [Normal Heart Sounds] : normal heart sounds [No Rash or Lesion] : No rash or lesion [Alert] : alert [Oriented to Person] : oriented to person [Oriented to Place] : oriented to place [Oriented to Time] : oriented to time [Calm] : calm [de-identified] : NAD [de-identified] : EOM intact [de-identified] : Supple [de-identified] : Abdomen - There is a transverse L subcostal scar which measures approximately 7 cm with surrounding subcutaneous fullness in area of prior lipoma resection.  There is mild left-sided tenderness.  The abdomen is soft without distension or CVA tenderness. [de-identified] : MARSHALL x 4

## 2022-07-19 ENCOUNTER — NON-APPOINTMENT (OUTPATIENT)
Age: 47
End: 2022-07-19

## 2022-07-19 LAB
ACTH SER-ACNC: 9.1 PG/ML
ALDOSTERONE SERUM: 3.4 NG/DL

## 2022-07-20 LAB — CORTIS SERPL-MCNC: 2 UG/DL

## 2022-07-22 ENCOUNTER — NON-APPOINTMENT (OUTPATIENT)
Age: 47
End: 2022-07-22

## 2022-07-23 LAB
METANEPHRINE, PL: <10 PG/ML
NORMETANEPHRINE, PL: 56.6 PG/ML
RENIN ACTIVITY, PLASMA: 0.67 NG/ML/HR

## 2022-07-26 ENCOUNTER — NON-APPOINTMENT (OUTPATIENT)
Age: 47
End: 2022-07-26

## 2022-07-27 LAB — DEXAMETHASONE LEVEL: 459 NG/DL

## 2022-07-28 ENCOUNTER — NON-APPOINTMENT (OUTPATIENT)
Age: 47
End: 2022-07-28

## 2022-08-07 ENCOUNTER — TRANSCRIPTION ENCOUNTER (OUTPATIENT)
Age: 47
End: 2022-08-07

## 2022-08-11 ENCOUNTER — NON-APPOINTMENT (OUTPATIENT)
Age: 47
End: 2022-08-11

## 2022-08-15 ENCOUNTER — APPOINTMENT (OUTPATIENT)
Dept: SURGERY | Facility: CLINIC | Age: 47
End: 2022-08-15

## 2022-08-18 ENCOUNTER — NON-APPOINTMENT (OUTPATIENT)
Age: 47
End: 2022-08-18

## 2022-08-19 LAB — CORTIS SAL-MCNC: NORMAL

## 2022-08-22 LAB
CORTIS 24H UR-MCNC: 24 H
CORTIS 24H UR-MRATE: 17 MCG/24 H
SPECIMEN VOL 24H UR: 1525 ML

## 2022-09-14 ENCOUNTER — APPOINTMENT (OUTPATIENT)
Dept: SURGERY | Facility: CLINIC | Age: 47
End: 2022-09-14

## 2022-09-14 DIAGNOSIS — D35.02 BENIGN NEOPLASM OF LEFT ADRENAL GLAND: ICD-10-CM

## 2022-09-14 PROCEDURE — 99213 OFFICE O/P EST LOW 20 MIN: CPT

## 2022-09-14 NOTE — PHYSICAL EXAM
[Respiratory Effort] : normal respiratory effort [Normal Heart Sounds] : normal heart sounds [No Rash or Lesion] : No rash or lesion [Alert] : alert [Oriented to Person] : oriented to person [Oriented to Place] : oriented to place [Oriented to Time] : oriented to time [Calm] : calm [de-identified] : NAD [de-identified] : EOM intact [de-identified] : Supple [de-identified] : Abdomen - There is a transverse L subcostal scar which measures approximately 7 cm with surrounding subcutaneous fullness in area of prior lipoma resection.  There is mild left-sided tenderness.  The abdomen is soft without distension or CVA tenderness. [de-identified] : MARSHALL x 4

## 2023-05-26 ENCOUNTER — INPATIENT (INPATIENT)
Facility: HOSPITAL | Age: 48
LOS: 1 days | Discharge: HOME | DRG: 175 | End: 2023-05-28
Attending: STUDENT IN AN ORGANIZED HEALTH CARE EDUCATION/TRAINING PROGRAM | Admitting: STUDENT IN AN ORGANIZED HEALTH CARE EDUCATION/TRAINING PROGRAM
Payer: MEDICAID

## 2023-05-26 VITALS
HEART RATE: 106 BPM | OXYGEN SATURATION: 92 % | SYSTOLIC BLOOD PRESSURE: 137 MMHG | DIASTOLIC BLOOD PRESSURE: 79 MMHG | WEIGHT: 182.1 LBS | RESPIRATION RATE: 22 BRPM | TEMPERATURE: 98 F

## 2023-05-26 DIAGNOSIS — I26.99 OTHER PULMONARY EMBOLISM WITHOUT ACUTE COR PULMONALE: ICD-10-CM

## 2023-05-26 DIAGNOSIS — I10 ESSENTIAL (PRIMARY) HYPERTENSION: ICD-10-CM

## 2023-05-26 DIAGNOSIS — Z98.890 OTHER SPECIFIED POSTPROCEDURAL STATES: ICD-10-CM

## 2023-05-26 DIAGNOSIS — R09.89 OTHER SPECIFIED SYMPTOMS AND SIGNS INVOLVING THE CIRCULATORY AND RESPIRATORY SYSTEMS: ICD-10-CM

## 2023-05-26 DIAGNOSIS — A41.9 SEPSIS, UNSPECIFIED ORGANISM: ICD-10-CM

## 2023-05-26 DIAGNOSIS — J96.01 ACUTE RESPIRATORY FAILURE WITH HYPOXIA: ICD-10-CM

## 2023-05-26 DIAGNOSIS — Z29.9 ENCOUNTER FOR PROPHYLACTIC MEASURES, UNSPECIFIED: ICD-10-CM

## 2023-05-26 DIAGNOSIS — Z98.41 CATARACT EXTRACTION STATUS, RIGHT EYE: Chronic | ICD-10-CM

## 2023-05-26 DIAGNOSIS — Z98.890 OTHER SPECIFIED POSTPROCEDURAL STATES: Chronic | ICD-10-CM

## 2023-05-26 DIAGNOSIS — E11.9 TYPE 2 DIABETES MELLITUS WITHOUT COMPLICATIONS: ICD-10-CM

## 2023-05-26 LAB
ALBUMIN SERPL ELPH-MCNC: 2.7 G/DL — LOW (ref 3.5–5)
ALP SERPL-CCNC: 71 U/L — SIGNIFICANT CHANGE UP (ref 40–120)
ALT FLD-CCNC: 20 U/L DA — SIGNIFICANT CHANGE UP (ref 10–60)
ANION GAP SERPL CALC-SCNC: 9 MMOL/L — SIGNIFICANT CHANGE UP (ref 5–17)
AST SERPL-CCNC: 9 U/L — LOW (ref 10–40)
BASOPHILS # BLD AUTO: 0.02 K/UL — SIGNIFICANT CHANGE UP (ref 0–0.2)
BASOPHILS NFR BLD AUTO: 0.2 % — SIGNIFICANT CHANGE UP (ref 0–2)
BILIRUB SERPL-MCNC: 0.7 MG/DL — SIGNIFICANT CHANGE UP (ref 0.2–1.2)
BUN SERPL-MCNC: 14 MG/DL — SIGNIFICANT CHANGE UP (ref 7–18)
CALCIUM SERPL-MCNC: 8.5 MG/DL — SIGNIFICANT CHANGE UP (ref 8.4–10.5)
CHLORIDE SERPL-SCNC: 111 MMOL/L — HIGH (ref 96–108)
CO2 SERPL-SCNC: 23 MMOL/L — SIGNIFICANT CHANGE UP (ref 22–31)
CREAT SERPL-MCNC: 0.65 MG/DL — SIGNIFICANT CHANGE UP (ref 0.5–1.3)
EGFR: 109 ML/MIN/1.73M2 — SIGNIFICANT CHANGE UP
EOSINOPHIL # BLD AUTO: 0.14 K/UL — SIGNIFICANT CHANGE UP (ref 0–0.5)
EOSINOPHIL NFR BLD AUTO: 1.5 % — SIGNIFICANT CHANGE UP (ref 0–6)
FLUAV AG NPH QL: SIGNIFICANT CHANGE UP
FLUBV AG NPH QL: SIGNIFICANT CHANGE UP
GLUCOSE BLDC GLUCOMTR-MCNC: 327 MG/DL — HIGH (ref 70–99)
GLUCOSE SERPL-MCNC: 230 MG/DL — HIGH (ref 70–99)
HCG SERPL-ACNC: <1 MIU/ML — SIGNIFICANT CHANGE UP
HCT VFR BLD CALC: 34.1 % — LOW (ref 34.5–45)
HGB BLD-MCNC: 11.1 G/DL — LOW (ref 11.5–15.5)
IMM GRANULOCYTES NFR BLD AUTO: 0.6 % — SIGNIFICANT CHANGE UP (ref 0–0.9)
LYMPHOCYTES # BLD AUTO: 1.72 K/UL — SIGNIFICANT CHANGE UP (ref 1–3.3)
LYMPHOCYTES # BLD AUTO: 18.3 % — SIGNIFICANT CHANGE UP (ref 13–44)
MCHC RBC-ENTMCNC: 31 PG — SIGNIFICANT CHANGE UP (ref 27–34)
MCHC RBC-ENTMCNC: 32.6 GM/DL — SIGNIFICANT CHANGE UP (ref 32–36)
MCV RBC AUTO: 95.3 FL — SIGNIFICANT CHANGE UP (ref 80–100)
MONOCYTES # BLD AUTO: 0.59 K/UL — SIGNIFICANT CHANGE UP (ref 0–0.9)
MONOCYTES NFR BLD AUTO: 6.3 % — SIGNIFICANT CHANGE UP (ref 2–14)
NEUTROPHILS # BLD AUTO: 6.87 K/UL — SIGNIFICANT CHANGE UP (ref 1.8–7.4)
NEUTROPHILS NFR BLD AUTO: 73.1 % — SIGNIFICANT CHANGE UP (ref 43–77)
NRBC # BLD: 0 /100 WBCS — SIGNIFICANT CHANGE UP (ref 0–0)
NT-PROBNP SERPL-SCNC: 21 PG/ML — SIGNIFICANT CHANGE UP (ref 0–125)
PLATELET # BLD AUTO: 270 K/UL — SIGNIFICANT CHANGE UP (ref 150–400)
POTASSIUM SERPL-MCNC: 4.1 MMOL/L — SIGNIFICANT CHANGE UP (ref 3.5–5.3)
POTASSIUM SERPL-SCNC: 4.1 MMOL/L — SIGNIFICANT CHANGE UP (ref 3.5–5.3)
PROT SERPL-MCNC: 6.9 G/DL — SIGNIFICANT CHANGE UP (ref 6–8.3)
RBC # BLD: 3.58 M/UL — LOW (ref 3.8–5.2)
RBC # FLD: 12.1 % — SIGNIFICANT CHANGE UP (ref 10.3–14.5)
SARS-COV-2 RNA SPEC QL NAA+PROBE: SIGNIFICANT CHANGE UP
SODIUM SERPL-SCNC: 143 MMOL/L — SIGNIFICANT CHANGE UP (ref 135–145)
TROPONIN I, HIGH SENSITIVITY RESULT: 38.7 NG/L — SIGNIFICANT CHANGE UP
WBC # BLD: 9.4 K/UL — SIGNIFICANT CHANGE UP (ref 3.8–10.5)
WBC # FLD AUTO: 9.4 K/UL — SIGNIFICANT CHANGE UP (ref 3.8–10.5)

## 2023-05-26 PROCEDURE — 93970 EXTREMITY STUDY: CPT | Mod: 26

## 2023-05-26 PROCEDURE — 99285 EMERGENCY DEPT VISIT HI MDM: CPT

## 2023-05-26 PROCEDURE — 71275 CT ANGIOGRAPHY CHEST: CPT | Mod: 26,MA

## 2023-05-26 PROCEDURE — 99223 1ST HOSP IP/OBS HIGH 75: CPT | Mod: GC

## 2023-05-26 RX ORDER — CYCLOBENZAPRINE HYDROCHLORIDE 10 MG/1
1 TABLET, FILM COATED ORAL
Refills: 0 | DISCHARGE

## 2023-05-26 RX ORDER — INSULIN GLARGINE 100 [IU]/ML
15 INJECTION, SOLUTION SUBCUTANEOUS
Refills: 0 | DISCHARGE

## 2023-05-26 RX ORDER — INSULIN LISPRO 100/ML
VIAL (ML) SUBCUTANEOUS
Refills: 0 | Status: DISCONTINUED | OUTPATIENT
Start: 2023-05-26 | End: 2023-05-28

## 2023-05-26 RX ORDER — APIXABAN 2.5 MG/1
10 TABLET, FILM COATED ORAL EVERY 12 HOURS
Refills: 0 | Status: DISCONTINUED | OUTPATIENT
Start: 2023-05-27 | End: 2023-05-27

## 2023-05-26 RX ORDER — ENOXAPARIN SODIUM 100 MG/ML
80 INJECTION SUBCUTANEOUS ONCE
Refills: 0 | Status: COMPLETED | OUTPATIENT
Start: 2023-05-26 | End: 2023-05-26

## 2023-05-26 RX ORDER — SENNA PLUS 8.6 MG/1
2 TABLET ORAL AT BEDTIME
Refills: 0 | Status: DISCONTINUED | OUTPATIENT
Start: 2023-05-26 | End: 2023-05-28

## 2023-05-26 RX ORDER — POLYETHYLENE GLYCOL 3350 17 G/17G
17 POWDER, FOR SOLUTION ORAL DAILY
Refills: 0 | Status: DISCONTINUED | OUTPATIENT
Start: 2023-05-26 | End: 2023-05-28

## 2023-05-26 RX ORDER — INSULIN LISPRO 100/ML
VIAL (ML) SUBCUTANEOUS AT BEDTIME
Refills: 0 | Status: DISCONTINUED | OUTPATIENT
Start: 2023-05-26 | End: 2023-05-28

## 2023-05-26 RX ORDER — LOSARTAN POTASSIUM 100 MG/1
1 TABLET, FILM COATED ORAL
Refills: 0 | DISCHARGE

## 2023-05-26 RX ORDER — INSULIN LISPRO 100/ML
6 VIAL (ML) SUBCUTANEOUS
Refills: 0 | DISCHARGE

## 2023-05-26 RX ADMIN — Medication 600 MILLIGRAM(S): at 21:41

## 2023-05-26 RX ADMIN — Medication 2: at 21:52

## 2023-05-26 RX ADMIN — ENOXAPARIN SODIUM 80 MILLIGRAM(S): 100 INJECTION SUBCUTANEOUS at 14:38

## 2023-05-26 NOTE — H&P ADULT - NSHPREVIEWOFSYSTEMS_GEN_ALL_CORE
REVIEW OF SYSTEMS:    CONSTITUTIONAL: No weakness, fevers or chills  EYES/ENT: No visual changes;  No vertigo or throat pain   NECK: No pain or stiffness  RESPIRATORY: + productive cough, No  wheezing, hemoptysis; + shortness of breath  CARDIOVASCULAR: No chest pain or palpitations  GASTROINTESTINAL: + abdominal pain at the surgical site, No epigastric pain. No nausea, vomiting, or hematemesis; No diarrhea or constipation. No melena or hematochezia.  GENITOURINARY: No dysuria, frequency or hematuria  NEUROLOGICAL: No numbness or weakness  SKIN: No itching, rashes

## 2023-05-26 NOTE — ED ADULT NURSE NOTE - NSFALLUNIVINTERV_ED_ALL_ED
Bed/Stretcher in lowest position, wheels locked, appropriate side rails in place/Call bell, personal items and telephone in reach/Instruct patient to call for assistance before getting out of bed/chair/stretcher/Non-slip footwear applied when patient is off stretcher/[place] to call system/Physically safe environment - no spills, clutter or unnecessary equipment/Purposeful proactive rounding/Room/bathroom lighting operational, light cord in reach

## 2023-05-26 NOTE — H&P ADULT - HISTORY OF PRESENT ILLNESS
47 yrs old F, w/ pmhx of DM, HTN, pxhs cataract, s/p abdominoplasty and bilateral arm liposuction performed on Monday May 22nd, presented to the hospital with shortness of breath. Pt reported that she had been having cough for the past 3 days however due to the pain from the abdominoplasty was not able to do so, she had white-miguel phlegm without any blood which she cannot properly clear from her chest. She noticed having heaviness on the chest and since yesterday she had shortness of breath worse when lying down and interfering with sleep and having food along with palpitation. Pt reported some mild sore throat which she attributes to  having the tube from the surgery/anesthesia. Pt reported that she was immobile on Monday and Tuesday and then started ambulating even when the pain was present. Pt denied any dizziness, N/V/D, abdominal pain except for the site of the surgery, urinary symptoms, lower extremity swelling/pain/erythema.   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     In ED, pt NAD, vital signs -104bpm, sat >92% on 3L NC, RR 25, /79.

## 2023-05-26 NOTE — ED PROVIDER NOTE - PROGRESS NOTE DETAILS
CT with  Bilateral lingular and right upper lobe segmental pulmonary emboli.  Lovenox given in ED.  pt to be admitted.

## 2023-05-26 NOTE — ED PROVIDER NOTE - OBJECTIVE STATEMENT
47-year-old female history of diabetes presents with shortness of breath.  As per patient 4 days ago she had a liposuction done.  Patient was in her normal state of health until yesterday when she states she began to feel short of breath.  Patient says she keeps feeling the need to cough however she is scared and reluctant to cough because she does not want to mess up the procedure she had done.  No fever no chest pain no nausea no vomiting no diarrhea.  Today the shortness of breath worsened as did the chest tightness the patient came to the ED for further evaluation.

## 2023-05-26 NOTE — H&P ADULT - NSHPSOCIALHISTORY_GEN_ALL_CORE
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

## 2023-05-26 NOTE — PROGRESS NOTE ADULT - SUBJECTIVE AND OBJECTIVE BOX
Patient is known to me as I performed her lipoabdominoplasty in my outpatient clinic earlier this week.  She underwent independent medical clearance performed by her primary care doctor after she worked diligently with this doctor for the last six months to control her uncontrolled type 2 DM.  Once cleared for surgery, I reviewed this clearance and agreed to proceed with operation on 5/22/25.  Patient has no history of VTE, does not use exogenous estrogen supplementation and her BMI was 31 at the time of surgery.  Reviewing the Caprini risk assessment model as well as Irineo Reid's recommendations for how they apply to plastic surgery patients, the decision was made that this patient was low to moderate risk for VTE at best, the decision to utilize chemoprophylaxis was deferred and mechanical DVT prophylaxis during surgery and early ambulation was prescribed per routine.  Patient reported to have routine post-operative course initially, describing minimal discomfort, less than expected for the surgery performed, and was beginning to return to her typical activities of daily living around the house and yard.  It seems that today she took a bit of a turn towards deconditioning, reporting that she had tightness in her chest, but not shortness of breath, and I instructed her to report to the emergency department for evaluation.  The discussion was held with the ER doctor to order CTA to R/O PE, begin supplemental O2 as needed and encourage deeper respirations and cough to combat atelectasis.  When CTA was found to have bilateral segmental PE, I was notified of her admission to the hospital.  By the time I arrived to examine the patient, she was administered Lovenox 80 mg SQ.  Doppler which was performed but not reported at the time demonstrated right peroneal venous thrombus.  On examination, she is in no apparent distress and comfortable with her respirations though when she goes to cough, it is with trepidation.  I have assured her that the discomfort she feels in her abdomen with coughing is normal and the abdominal wall plication should withstand the force of her cough.  I instructed to "hug a pillow" when coughing to provide external support to the abdominal wall for comfort.  She is saturating 93% on 4LNC, and is tachycardic on continuous remote telemetry monitoring.  Labs as expected, minimal blood loss during surgery but large skin and subcutaneous tissue specimen plus lipoaspirate confers bloodloss consistent with her results today.  She has no leukocytosis.  On exam, her abdomen is with good contour, edematous as expected for 5 days post op, with minimal ecchymosis.  Umbilicus is well inset with some degree of relative ischemia which calls only for expectant observation at this point.  The incisions are clean and dry, well approximated and there is no leaking around the two AUDIE drains.  When I examined the patient, her AUDIE drains were not constituted and as result there was no output since several hours earlier, possibly emptied in the ER.  I stripped the drains and constituted the passive suction bulbs and flow resumed.  Both arms have good contour with minimal ecchymosis and expected tenderness of the suction-assisted lipectomy treated areas.    A/P:  Segmental PEs with resultant tachycardia and hypoxia, unilateral distal DVT  -Encourage ambulation, encourage incentive spirometry, encourage deep cough and optimal chest excursion -- it would not be beneficial for atelectasis and pneumonia to be superimposed onto ventilation/perfusion mismatch.  -Abdominal binder to be comfortable -- beneficial to reduce AUDIE output which may increase now that patient will be fully anticoagulated, but binder should not inhibit patient's ability for deep respiratory excursion.  -No necessity of wound management -- dressings placed in OR to remain in place for now.  Patient may shower regularly but not submerge.  -Management of VTE per protocol of primary team.  Risk of surgical site hematoma development at this point is acceptably low and risk of additional VTE supercedes all other risk.  -Management of supplemental oxygen -- maintain until patient is able to ambulate and remain above at least Sa02 92% on room air.  -When VTE management and O2 management is optimized and patient is fit for discharge from perspective of primary team, she is fit for discharge from my perspective as well.  -I will manage her drains and the rest of her post-operative care and she will follow up with me on a schedule that she and I will determine.

## 2023-05-26 NOTE — H&P ADULT - ASSESSMENT
47 yrs old F, w/ pmhx of DM, HTN, pxhs cataract, s/p abdominoplasty and bilateral arm liposuction performed on Monday May 22nd, presented to the hospital with shortness of breath. Pt is admitted for acute hypoxic respiratory failure 2/2 bilateral PE.

## 2023-05-26 NOTE — ED PROVIDER NOTE - CLINICAL SUMMARY MEDICAL DECISION MAKING FREE TEXT BOX
47-year-old female history of diabetes presents with shortness of breath.  Patient had liposuction done 4 days ago.  Shortness of breath associated with chest tightness.  Concern for possible PE versus pneumonia.  Will check labs CTA chest EKG reassess

## 2023-05-26 NOTE — H&P ADULT - PROBLEM SELECTOR PLAN 3
s/p abdominoplasty and brachioplasty bilateral on Monday    drains noted at the lower abdomen with minimal sanguinous drainage, the suture sites are intact without any discharges  - Miralax and Senna PRN for constipation  - Lozenges for sore throat [general anesthesia was done for surgery]

## 2023-05-26 NOTE — H&P ADULT - NSICDXPASTSURGICALHX_GEN_ALL_CORE_FT
PAST SURGICAL HISTORY:  S/P abdominoplasty     S/P brachioplasty     S/P right cataract extraction

## 2023-05-26 NOTE — PHARMACOTHERAPY INTERVENTION NOTE - COMMENTS
Medication reconciliation done with patient and outside medication list was updated. Attempted to contact her primary pharmacy ([facility] at [phone] [phone]) but it is closed until Monday 5/29. Instructions for naproxen, cyclobenzaprine, ondansetron, and cefdinir were obtained from [facility] at [phone] [place] [address] [phone].

## 2023-05-26 NOTE — H&P ADULT - PROBLEM SELECTOR PLAN 6
- DVT: s/p Lovenox 80 mg in ED on Friday 5/26, to start Eliquis 10 mg BID for 7 days from 5/27 for PE

## 2023-05-26 NOTE — H&P ADULT - PROBLEM SELECTOR PLAN 5
pmhx HTN on Losartan  - will hold meds for now  - monitor BP  - will consider initiating home med if BP>140/90

## 2023-05-26 NOTE — H&P ADULT - NSHPPHYSICALEXAM_GEN_ALL_CORE
GENERAL: NAD, lying in bed comfortably  HEAD:  Atraumatic, Normocephalic  EYES: EOMI, PERRLA, conjunctiva and sclera clear  ENT: Moist mucous membranes  NECK: Supple, No JVD  CHEST/LUNG: Clear to auscultation bilaterally; +  rhonchi bilateral lower lungs Rt>Lt , No wheezing, or rubs. Unlabored respirations  HEART: Regular rate and rhythm; No murmurs, rubs, or gallops  ABDOMEN: Bowel sounds present; Soft, Nondistended. + tender on the lower abdomen at the site of surgery, 2 drains in place with minimal sanguinous drainage, surgical site intact without any discharges   EXTREMITIES:  2+ Peripheral Pulses, brisk capillary refill. No clubbing, cyanosis, or edema  NERVOUS SYSTEM:  Alert & Oriented X3, speech clear. No deficits   MSK: FROM all 4 extremities, full and equal strength  SKIN: surgical sites without discharge and intact suture noted on the lower abdomen

## 2023-05-26 NOTE — H&P ADULT - PROBLEM SELECTOR PLAN 1
dyspnea for the past 2 days, s/p abdominoplasty and brachioplasty   Acute resp failure 2/2 bilateral PE   CT angio chest - noted  in ED -104bpm, sat >92% on 3L NC, RR 25, /79.  - s/p Lovenox 80 mg in ED  - will start Eliquis 10 mg BID for 7 days tomorrow Saturday on 5/27 followed by Eliquis 5 mg BID for at least 3 months thereafter  - c/w tele   - f/u Echo  - f/u LE doppler US to rule out DVT   -  ---- Pulmonology consulted dyspnea for the past 2 days, s/p abdominoplasty and brachioplasty   Acute resp failure 2/2 bilateral PE   CT angio chest - noted  in ED -104bpm, sat >92% on 3L NC, RR 25, /79.  - s/p Lovenox 80 mg in ED  - will start Eliquis 10 mg BID for 7 days tomorrow Saturday on 5/27 followed by Eliquis 5 mg BID for at least 3 months thereafter  - c/w tele   - f/u Echo  - f/u LE doppler US to rule out DVT   - Dr. Colvin Pulmonology consulted

## 2023-05-26 NOTE — PATIENT PROFILE ADULT - FALL HARM RISK - UNIVERSAL INTERVENTIONS
Bed in lowest position, wheels locked, appropriate side rails in place/Call bell, personal items and telephone in reach/Instruct patient to call for assistance before getting out of bed or chair/Non-slip footwear when patient is out of bed/[place] to call system/Physically safe environment - no spills, clutter or unnecessary equipment/Purposeful Proactive Rounding/Room/bathroom lighting operational, light cord in reach

## 2023-05-27 ENCOUNTER — TRANSCRIPTION ENCOUNTER (OUTPATIENT)
Age: 48
End: 2023-05-27

## 2023-05-27 DIAGNOSIS — D72.829 ELEVATED WHITE BLOOD CELL COUNT, UNSPECIFIED: ICD-10-CM

## 2023-05-27 LAB
ANION GAP SERPL CALC-SCNC: 9 MMOL/L — SIGNIFICANT CHANGE UP (ref 5–17)
BUN SERPL-MCNC: 13 MG/DL — SIGNIFICANT CHANGE UP (ref 7–18)
CALCIUM SERPL-MCNC: 9 MG/DL — SIGNIFICANT CHANGE UP (ref 8.4–10.5)
CHLORIDE SERPL-SCNC: 109 MMOL/L — HIGH (ref 96–108)
CO2 SERPL-SCNC: 22 MMOL/L — SIGNIFICANT CHANGE UP (ref 22–31)
CREAT SERPL-MCNC: 0.63 MG/DL — SIGNIFICANT CHANGE UP (ref 0.5–1.3)
EGFR: 110 ML/MIN/1.73M2 — SIGNIFICANT CHANGE UP
GLUCOSE BLDC GLUCOMTR-MCNC: 186 MG/DL — HIGH (ref 70–99)
GLUCOSE BLDC GLUCOMTR-MCNC: 265 MG/DL — HIGH (ref 70–99)
GLUCOSE BLDC GLUCOMTR-MCNC: 298 MG/DL — HIGH (ref 70–99)
GLUCOSE BLDC GLUCOMTR-MCNC: 356 MG/DL — HIGH (ref 70–99)
GLUCOSE BLDC GLUCOMTR-MCNC: 368 MG/DL — HIGH (ref 70–99)
GLUCOSE SERPL-MCNC: 280 MG/DL — HIGH (ref 70–99)
HCT VFR BLD CALC: 33.7 % — LOW (ref 34.5–45)
HGB BLD-MCNC: 11 G/DL — LOW (ref 11.5–15.5)
MAGNESIUM SERPL-MCNC: 2.3 MG/DL — SIGNIFICANT CHANGE UP (ref 1.6–2.6)
MCHC RBC-ENTMCNC: 30.7 PG — SIGNIFICANT CHANGE UP (ref 27–34)
MCHC RBC-ENTMCNC: 32.6 GM/DL — SIGNIFICANT CHANGE UP (ref 32–36)
MCV RBC AUTO: 94.1 FL — SIGNIFICANT CHANGE UP (ref 80–100)
NRBC # BLD: 0 /100 WBCS — SIGNIFICANT CHANGE UP (ref 0–0)
PHOSPHATE SERPL-MCNC: 3.4 MG/DL — SIGNIFICANT CHANGE UP (ref 2.5–4.5)
PLATELET # BLD AUTO: 279 K/UL — SIGNIFICANT CHANGE UP (ref 150–400)
POTASSIUM SERPL-MCNC: 4 MMOL/L — SIGNIFICANT CHANGE UP (ref 3.5–5.3)
POTASSIUM SERPL-SCNC: 4 MMOL/L — SIGNIFICANT CHANGE UP (ref 3.5–5.3)
RBC # BLD: 3.58 M/UL — LOW (ref 3.8–5.2)
RBC # FLD: 12.1 % — SIGNIFICANT CHANGE UP (ref 10.3–14.5)
SODIUM SERPL-SCNC: 140 MMOL/L — SIGNIFICANT CHANGE UP (ref 135–145)
WBC # BLD: 14.18 K/UL — HIGH (ref 3.8–10.5)
WBC # FLD AUTO: 14.18 K/UL — HIGH (ref 3.8–10.5)

## 2023-05-27 PROCEDURE — 99222 1ST HOSP IP/OBS MODERATE 55: CPT

## 2023-05-27 PROCEDURE — 99233 SBSQ HOSP IP/OBS HIGH 50: CPT

## 2023-05-27 RX ORDER — APIXABAN 2.5 MG/1
2 TABLET, FILM COATED ORAL
Qty: 24 | Refills: 0
Start: 2023-05-27 | End: 2023-06-01

## 2023-05-27 RX ORDER — ACETAMINOPHEN 500 MG
650 TABLET ORAL ONCE
Refills: 0 | Status: COMPLETED | OUTPATIENT
Start: 2023-05-27 | End: 2023-05-27

## 2023-05-27 RX ORDER — APIXABAN 2.5 MG/1
1 TABLET, FILM COATED ORAL
Qty: 180 | Refills: 0
Start: 2023-05-27 | End: 2023-08-24

## 2023-05-27 RX ORDER — APIXABAN 2.5 MG/1
10 TABLET, FILM COATED ORAL EVERY 12 HOURS
Refills: 0 | Status: DISCONTINUED | OUTPATIENT
Start: 2023-05-27 | End: 2023-05-28

## 2023-05-27 RX ORDER — APIXABAN 2.5 MG/1
1 TABLET, FILM COATED ORAL
Refills: 0
Start: 2023-05-27

## 2023-05-27 RX ORDER — ONDANSETRON 8 MG/1
1 TABLET, FILM COATED ORAL
Refills: 0 | DISCHARGE

## 2023-05-27 RX ORDER — CEFDINIR 250 MG/5ML
1 POWDER, FOR SUSPENSION ORAL
Refills: 0 | DISCHARGE

## 2023-05-27 RX ADMIN — Medication 1: at 17:11

## 2023-05-27 RX ADMIN — APIXABAN 10 MILLIGRAM(S): 2.5 TABLET, FILM COATED ORAL at 02:03

## 2023-05-27 RX ADMIN — Medication 600 MILLIGRAM(S): at 05:38

## 2023-05-27 RX ADMIN — SENNA PLUS 2 TABLET(S): 8.6 TABLET ORAL at 22:20

## 2023-05-27 RX ADMIN — APIXABAN 10 MILLIGRAM(S): 2.5 TABLET, FILM COATED ORAL at 17:10

## 2023-05-27 RX ADMIN — Medication 3: at 22:27

## 2023-05-27 RX ADMIN — Medication 3: at 08:05

## 2023-05-27 RX ADMIN — Medication 5: at 12:06

## 2023-05-27 RX ADMIN — Medication 600 MILLIGRAM(S): at 17:10

## 2023-05-27 NOTE — CONSULT NOTE ADULT - SUBJECTIVE AND OBJECTIVE BOX
CC: sob     History of Present Illness:  Reason for Admission: Bilateral PE  History of Present Illness:   47 yrs old F, w/ pmhx of DM, HTN, pxhs cataract, s/p abdominoplasty and bilateral arm liposuction performed on Monday May 22nd, presented to the hospital with shortness of breath. Pt reported that she had been having cough for the past 3 days however due to the pain from the abdominoplasty was not able to do so, she had white-miguel phlegm without any blood which she cannot properly clear from her chest. She noticed having heaviness on the chest and since yesterday she had shortness of breath worse when lying down and interfering with sleep and having food along with palpitation. Pt reported some mild sore throat which she attributes to  having the tube from the surgery/anesthesia. Pt reported that she was immobile on Monday and Tuesday and then started ambulating even when the pain was present. Pt denied any dizziness, N/V/D, abdominal pain except for the site of the surgery, urinary symptoms, lower extremity swelling/pain/erythema.   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     In ED, pt NAD, vital signs -104bpm, sat >92% on 3L NC, RR 25, /79.   Today pt reports cough, chest congestion and difficulty coughing due to abd strain.  She is sob with ambulation and last some chest discomfort but no pain. no leg swelling       Review of Systems:  Review of Systems: REVIEW OF SYSTEMS:    CONSTITUTIONAL: No weakness, fevers or chills  EYES/ENT: No visual changes;  No vertigo or throat pain   NECK: No pain or stiffness  RESPIRATORY: + productive cough, No  wheezing, hemoptysis; + shortness of breath  CARDIOVASCULAR: No chest pain or palpitations  GASTROINTESTINAL: + abdominal pain at the surgical site, No epigastric pain. No nausea, vomiting, or hematemesis; No diarrhea or constipation. No melena or hematochezia.  GENITOURINARY: No dysuria, frequency or hematuria  NEUROLOGICAL: No numbness or weakness  SKIN: No itching, rashes      Allergies and Intolerances:        Allergies:  	iodine containing compounds: Drug Category, Other, high blood pressure    Home Medications:   * Patient Currently Takes Medications as of 26-May-2023 15:30 documented in Structured Notes  · 	naproxen 500 mg oral tablet: Last Dose Taken:  , 1 tab(s) orally 2 times a day  · 	ondansetron 8 mg oral tablet, disintegrating: Last Dose Taken:  , 1 tab(s) orally every 8 hours  · 	cyclobenzaprine 10 mg oral tablet: Last Dose Taken:  , 1 tab(s) orally 3 times a day  · 	losartan 50 mg oral tablet: Last Dose Taken:  , 1 tab(s) orally 2 times a day  · 	cefdinir 300 mg oral capsule: Last Dose Taken:  , 1 cap(s) orally 2 times a day  · 	Admelog 100 units/mL injectable solution: Last Dose Taken:  , 6 unit(s) injectable 2 times a day in the morning and after dinner, up to 8 units 2 times a day in the morning and after dinner  (based on blood sugar check, hold if 120 or lower)  · 	Lantus 100 units/mL subcutaneous solution: Last Dose Taken:  , 15 unit(s) subcutaneous once a day at night up to 20 units once a day at night (based on blood sugar check)    Patient History:    Past Medical, Past Surgical, and Family History:  PAST MEDICAL HISTORY:  Diabetes     HTN (hypertension).     PAST SURGICAL HISTORY:  S/P abdominoplasty     S/P brachioplasty     S/P right cataract extraction.     Social History:  · Substance use	Yes  · Alcohol use	occasionally  ·   MEDICATIONS  (STANDING):  apixaban 10 milliGRAM(s) Oral every 12 hours  guaiFENesin  milliGRAM(s) Oral every 12 hours  insulin lispro (ADMELOG) corrective regimen sliding scale   SubCutaneous at bedtime  insulin lispro (ADMELOG) corrective regimen sliding scale   SubCutaneous three times a day before meals    MEDICATIONS  (PRN):  polyethylene glycol 3350 17 Gram(s) Oral daily PRN Constipation  senna 2 Tablet(s) Oral at bedtime PRN Constipation      ICU Vital Signs Last 24 Hrs  T(C): 36.8 (27 May 2023 09:56), Max: 38 (27 May 2023 01:04)  T(F): 98.2 (27 May 2023 09:56), Max: 100.4 (27 May 2023 01:04)  HR: 92 (27 May 2023 09:56) (92 - 120)  BP: 108/60 (27 May 2023 09:56) (108/60 - 139/63)  BP(mean): --  ABP: --  ABP(mean): --  RR: 17 (27 May 2023 09:56) (17 - 18)  SpO2: 94% (27 May 2023 09:56) (92% - 95%)    O2 Parameters below as of 27 May 2023 09:56  Patient On (Oxygen Delivery Method): nasal cannula  O2 Flow (L/min): 2        Physical Exam:   Physical Exam: GENERAL: NAD, lying in bed comfortably  HEAD:  Atraumatic, Normocephalic  EYES: EOMI, PERRLA, conjunctiva and sclera clear  ENT: Moist mucous membranes  NECK: Supple, No JVD  CHEST/LUNG: Clear to auscultation bilaterally; +  rhonchi bilateral lower lungs Rt>Lt , No wheezing, or rubs. Unlabored respirations  HEART: Regular rate and rhythm; No murmurs, rubs, or gallops  ABDOMEN: Bowel sounds present; Soft, Nondistended. + tender on the lower abdomen at the site of surgery, 2 drains in place with minimal sanguinous drainage, surgical site intact without any discharges   EXTREMITIES:  2+ Peripheral Pulses, brisk capillary refill. No clubbing, cyanosis, or edema  NERVOUS SYSTEM:  Alert & Oriented X3, speech clear. No deficits   MSK: FROM all 4 extremities, full and equal strength  SKIN: surgical sites without discharge and intact suture noted on the lower abdomen      Laboratory:                         11.0   14.18 )-----------( 279      ( 27 May 2023 06:10 )             33.7   05-27    140  |  109<H>  |  13  ----------------------------<  280<H>  4.0   |  22  |  0.63    Ca    9.0      27 May 2023 06:10  Phos  3.4     05-27  Mg     2.3     05-27    TPro  6.9  /  Alb  2.7<L>  /  TBili  0.7  /  DBili  x   /  AST  9<L>  /  ALT  20  /  AlkPhos  71  05-26    RADIOLOGY:  < from: CT Angio Chest PE Protocol w/ IV Cont (05.26.23 @ 13:11) >    ACC: [id number] EXAM:  CT ANGIO CHEST PULM ART WAWI   ORDERED BY: ALICIA THORNTON     PROCEDURE DATE:  05/26/2023          INTERPRETATION:  CTA CHEST    INDICATION: Difficulty breathing. Evaluate for pulmonary embolus.    TECHNIQUE: Enhanced helical images were obtained of the chest. Coronal   and sagittal images were reconstructed.  Images were obtained after the   uneventful administration of 50 cc of nonionic intravenous contrast   (Omnipaque 350). 50 cc of nonionic intravenous contrast (Omnipaque 350)   was discarded. Maximum intensity projection images were generated.    COMPARISON: Radiograph chest 7/8/2017.    FINDINGS:    Pulmonary Artery:  Bilateral segmental pulmonary emboli involve the   superior and inferior lingular artery and the apical segmental artery of   the right upper lobe. No other emboli are shown.    Tubes/Lines: None.    Lungs, airways and pleura: Left lower lobe linear atelectasis. Right   middle and right lower lobe linear atelectasis.    The right diaphragm is elevated. There are secretions within the bronchus   intermedius, right middle and right lower lobe bronchi, and the segmental   bronchi of the right middle and right lower lobes.    No pneumothorax. No pleural effusion.    Mediastinum: The visualized thyroid gland is normal. The upper and lower   paratracheal and subcarinal lymph nodes measure less than 10 mm the short   axis. The esophagus is unremarkable.    The heart is normal in size. The aorta is normal in caliber.    Upper Abdomen: Arising from the left adrenal gland is a 3.0 x 2.6 cm   adenoma.    Bones And Soft Tissues: The bones are unremarkable.  The soft tissues are   unremarkable.      IMPRESSION:    1.  Bilateral lingular and right upper lobe segmental pulmonary emboli.  2.  Elevated right diaphragm with right middle and right lower lobe   linear atelectasis.  3.  Secretions within the bronchus intermedius, right middle and right   lower lobe bronchus, and the segmental bronchi of the right middle and   right lower lobe.  4.  The findings were discussed with and read back verification obtained   from Dr. Thornton on 5/26/2022 at 1353 hours.    --- End of Report ---      < end of copied text >  < from: US Duplex Venous Lower Ext Complete, Bilateral (05.26.23 @ 17:20) >    ACC: [id number] EXAM:  US DPLX LWR EXT VEINS COMPL BI   ORDERED BY: JOSÉ CASTILLO     PROCEDURE DATE:  05/26/2023          INTERPRETATION:  CLINICAL INFORMATION: dyspnea s/p surgery    COMPARISON: None available.    TECHNIQUE: Duplex sonography of the BILATERAL LOWER extremity veins with   color and spectral Doppler, with and without compression.    FINDINGS:    RIGHT:  Normal compressibility of the RIGHT common femoral, femoral and popliteal   veins.  Doppler examination shows normal spontaneousand phasic flow.  Occlusive DVT within the right peroneal vein..    LEFT:  Normal compressibility of the LEFT common femoral, femoral and popliteal   veins.  Doppler examination shows normal spontaneous and phasic flow.  No LEFT calf vein thrombosis is detected.    IMPRESSION:  Occlusive DVT within the right peroneal vein.    No other evidence DVT within the right or left lower extremity otherwise   present.    Dr Castillo notified of the findings by the ultrasound technologist Hope Carr [id number] on May 26, 2023.    --- End of Report ---      < end of copied text >

## 2023-05-27 NOTE — CONSULT NOTE ADULT - TIME BILLING
Reviewed all chart, labs and imaging.  Personally reviewed CT chest imaging  Assessment of O2 needs, both at rest and ambulating  Incentive spirometry teaching  Educated on venous thromboembolism risks, management and prognosis  Coordination of care with primary team.

## 2023-05-27 NOTE — PROGRESS NOTE ADULT - PROBLEM SELECTOR PLAN 1
dyspnea for the past 2 days, s/p abdominoplasty and brachioplasty   Acute resp failure 2/2 bilateral PE   CT angio chest - noted  in ED -104bpm, sat >92% on 3L NC, RR 25, /79.  - s/p Lovenox 80 mg in ED  - will start Eliquis 10 mg BID for 7 days tomorrow Saturday on 5/27 followed by Eliquis 5 mg BID for at least 3 months thereafter  - c/w tele   - f/u Echo  - f/u LE doppler US to rule out DVT   - Dr. Colvin Pulmonology consulted dyspnea for the past 2 days, s/p abdominoplasty and brachioplasty   Acute resp failure 2/2 bilateral PE   CT angio chest showed Bilateral lingular and right upper lobe segmental pulmonary emboli.  in ED -104bpm, sat >92% on 3L NC, RR 25, /79.  - s/p Lovenox 80 mg in ED  - will start Eliquis 10 mg BID for 7 days tomorrow Saturday on 5/27 followed by Eliquis 5 mg BID for at least 3 months thereafter  - c/w tele   - f/u Echo  - f/u LE doppler US to rule out DVT   - Dr. Colvin Pulmonology consulted dyspnea for the past 2 days, s/p abdominoplasty and brachioplasty   Acute resp failure 2/2 bilateral PE   CT angio chest showed Bilateral lingular and right upper lobe segmental pulmonary emboli.  in ED -104bpm, sat >92% on 3L NC, RR 25, /79.  - s/p Lovenox 80 mg in ED  - will start Eliquis 10 mg BID for 7 days on 5/27 followed by Eliquis 5 mg BID for at least 3 months thereafter  - c/w tele   - f/u Echo  - LE doppler US showed Occlusive DVT within the right peroneal vein.  - Dr. Colvin Pulmonology consulted  -  titrate off oxygen as needed  - Please, call pharmacy to confirm coverage of eliquis (they are closed for the holidays till Monday)

## 2023-05-27 NOTE — CONSULT NOTE ADULT - ASSESSMENT
47F with provoked PE/DVT, postoperative.  Clinically stable hemodynamically, O2 sat 99%RA at rest and 93% with ambulation on RA.  Mild tachycardia, rosie with ambulation.  Cardiac enzymes normal, no evidence of RV strain on imaging.  I personally reviewed CT chest - agree with radiology read.   Pt has significant large airway inflammation with mucus plugging, leukocytosis and fever. Some could be related to PE/DVT but would cover with abx for acute bronchitis as well.  right peroneal occlusive DVT on sono  TTE pending.    Recommendations:  abx for acute bronchitis  cough expectorant   pain control for abd splinting  OOBTC and ambulation  Incentive spirometry 10x/hr  Titrate off supplemental O2, pt not needing O2 at this time  cont DOAC, tolerating well  Low risk PE based on lack of evidence of heart strain, echo pending

## 2023-05-27 NOTE — PROGRESS NOTE ADULT - PROBLEM SELECTOR PLAN 5
pmhx HTN on Losartan  - will hold meds for now  - monitor BP  - will consider initiating home med if BP>140/90 on Admelog and Lantus at home  recent HbA1C 6.9%  - c/w ISS   - adjust insulin according to blood glucose levels

## 2023-05-27 NOTE — DIETITIAN INITIAL EVALUATION ADULT - PERTINENT LABORATORY DATA
05-27    140  |  109<H>  |  13  ----------------------------<  280<H>  4.0   |  22  |  0.63    Ca    9.0      27 May 2023 06:10  Phos  3.4     05-27  Mg     2.3     05-27    TPro  6.9  /  Alb  2.7<L>  /  TBili  0.7  /  DBili  x   /  AST  9<L>  /  ALT  20  /  AlkPhos  71  05-26  POCT Blood Glucose.: 368 mg/dL (05-27-23 @ 11:36)

## 2023-05-27 NOTE — DIETITIAN INITIAL EVALUATION ADULT - PROBLEM SELECTOR PLAN 1
dyspnea for the past 2 days, s/p abdominoplasty and brachioplasty   Acute resp failure 2/2 bilateral PE   CT angio chest - noted  in ED -104bpm, sat >92% on 3L NC, RR 25, /79.  - s/p Lovenox 80 mg in ED  - will start Eliquis 10 mg BID for 7 days tomorrow Saturday on 5/27 followed by Eliquis 5 mg BID for at least 3 months thereafter  - c/w tele   - f/u Echo  - f/u LE doppler US to rule out DVT   - Dr. Colvin Pulmonology consulted

## 2023-05-27 NOTE — DISCHARGE NOTE PROVIDER - HOSPITAL COURSE
47 yrs old F, w/ pmhx of DM, HTN, pxhs cataract, s/p abdominoplasty and bilateral arm liposuction performed on Monday May 22nd, presented to the hospital with shortness of breath. Pt is admitted for acute hypoxic respiratory failure 2/2 bilateral PE. CT angio chest showed Bilateral lingular and right upper lobe segmental pulmonary emboli. LE doppler US showed Occlusive DVT within the right peroneal vein. Pt was started on Eliquis 10 mg BID on 05/27 for 7 days followed by Eliquis 5 mg BID for at least 3 months thereafter. Pulm Dr. Colvin was consulted. Pt was hypoxic requiring oxygen supplementation. Pt oxygenation improved and she was saturating well on RA. Echo showed [id number].  Pt had abdominoplasty and brachioplasty bilateral on Monday. Drains noted at the lower abdomen with minimal sanguinous drainage, the suture sites are intact without any discharges. Plastic surgeon consulted recommended conservative management and follow up outpatient. Pt has hx of DM, on Admelog and Lantus at home. Recent HbA1C 6.9%. Pt wsa treated with ISS during hospitalization. Pt has hx of HTN on Losartan. Medications were held and BP was controlled off medications. Given patient's improved clinical status and current hemodynamic stability, decision was made to discharge the patient. Patient is stable for discharge per attending and is advised to follow up with PCP as outpatient. Please refer to patient's complete medical chart with documents for a full hospital course, for this is only a brief summary.    47 yrs old F, w/ pmhx of DM, HTN, pxhs cataract, s/p abdominoplasty and bilateral arm liposuction performed on Monday May 22nd, presented to the hospital with shortness of breath. Pt is admitted for acute hypoxic respiratory failure 2/2 bilateral PE. CT angio chest showed Bilateral lingular and right upper lobe segmental pulmonary emboli. LE doppler US showed Occlusive DVT within the right peroneal vein. Pt was started on Eliquis 10 mg BID on 05/27 for 7 days followed by Eliquis 5 mg BID for at least 3 months thereafter. Pulm Dr. Colvin was consulted. Pt was hypoxic requiring oxygen supplementation. Pt oxygenation improved and she was saturating well on RA. Echo showed     Derived Variables:  LVMI: 83 g/m2  RWT: 0.40  Ejection Fraction Visual Estimate: >55 %    ------------------------------------------------------------------------  OBSERVATIONS:  Mitral Valve: Normal mitral valve.  Aortic Root: Normal aortic root.  Aortic Valve: Aortic valve not well visualized.  Left Atrium: Normal left atrium.  LA volume index = 18  cc/m2.  Left Ventricle: Endocardium not well visualized; grossly  normal left ventricular systolic function based on limited  views.   Segmental wall motion could not be assessed.  Normal left ventricular internal dimensions and wall  thicknesses. Unable to adequately assess diastolic function  due to technical aspects of this study.  Right Heart: Normal right atrium. Right ventricle not well  visualized. Probably normal right ventricular size and  systolic function. Normal tricuspid valve. There is trace  pulmonic regurgitation.  Pericardium/PleuraNormal pericardium with no pericardial  effusion.  Hemodynamic: Incomplete tricuspid regurgitation jet  precludes accurate assessment of pulmonary artery systolic  pressure.  ------------------------------------------------------------------------  CONCLUSIONS:  TECHNICALLY VERY DIFFICULT STUDY, POOR ACOUSTIC WINDOWS    1. Normal left ventricular internal dimensions and wall  thicknesses.  2. Endocardium not well visualized; grossly normal left  ventricular systolic function based on limited views.  Segmental wall motion could not be assessed.  3. Right ventricle not well visualized. Probably normal  right ventricular size and systolic function.    ------------------------------------------------------------------------    Pt had abdominoplasty and brachioplasty bilateral on Monday. Drains noted at the lower abdomen with minimal sanguinous drainage, the suture sites are intact without any discharges. Plastic surgeon consulted recommended conservative management and follow up outpatient. Pt has hx of DM, on Admelog and Lantus at home. Recent HbA1C 6.9%. Pt wsa treated with ISS during hospitalization. Pt has hx of HTN on Losartan. Medications were held and BP was controlled off medications. Given patient's improved clinical status and current hemodynamic stability, decision was made to discharge the patient. Patient is stable for discharge per attending and is advised to follow up with PCP as outpatient. Please refer to patient's complete medical chart with documents for a full hospital course, for this is only a brief summary.

## 2023-05-27 NOTE — CHART NOTE - NSCHARTNOTEFT_GEN_A_CORE
As per guideline, Transitioning from LMWH (therapeutic dose) to apixaban: Initiate apixaban at the time of the next scheduled dose of the parenteral anticoagulant. Lovenox given at 2 pm on 5/26/23. Apixaban order modified to start at 2 am on 5/27/23. Will keep monitoring for now.

## 2023-05-27 NOTE — DISCHARGE NOTE PROVIDER - NSDCCPCAREPLAN_GEN_ALL_CORE_FT
PRINCIPAL DISCHARGE DIAGNOSIS  Diagnosis: Pulmonary embolism  Assessment and Plan of Treatment: You presented to the hospital with shortness of breath. You were admitted for acute hypoxic respiratory failure secondary to bilateral pulmonary embolism (blood clot in the lungs). CT angio chest showed blood clots in the lungs. doppler US of your legs also showed blood clots in the right peroneal vein. You were seen by lung doctor Dr. Colvin. Your oxygen levels were low and you required oxygen supplementation. Your oxygenation improved and you were saturating well on room air. Echo showed xxxxxxxxxxxxxxx  . You were started on Eliquis 10 mg twice a day on 05/27. PLEASE CONTINUE TAKING ELIQUIS 5 MG 2 TABLETS TWICE A DAY UNTIL 06/02/2023. PLEASE, CONTINUE TAKING ELIQUIS 5 MG ONE TABLET TWICE A DAY THEREAFTER FOR 3 MONTHS UNTIL 08/31/2023. Please follow up with your primary care physician and Dr. Colvin in one week to inform them of your recent hospitalization and further management of your medical conditions.      SECONDARY DISCHARGE DIAGNOSES  Diagnosis: S/P abdominoplasty  Assessment and Plan of Treatment: You had abdominoplasty and brachioplasty bilateral on Monday. Your drains noted at the lower abdomen with minimal sanguinous drainage, the suture sites are intact without any discharges. You were seen by plastic surgeon who recommended conservative management and follow up outpatient. Please follow up with your primary care physician and plastic surgeon in one week to inform them of your recent hospitalization and further management of your medical conditions.      Diagnosis: DM (diabetes mellitus)  Assessment and Plan of Treatment: Maintaining blood glucose level within normal range.  - You have a history of diabetes  - Your HbA1c is 6.9%  - You should continue to take your medication regimen regularly as prescribed  - Please follow up with your primary care provider/endocrinologist within a week of discharge.  - You need to continue monitoring your blood sugar levels closely.  - Please maintain healthy lifestyle by eating healthy diabetic regimen, weight loss and exercise regularly as tolerated.  Make sure you get your HgA1c checked every three months.  check your blood glucose before meals and at bedtime.  It's important not to skip any meals.  Keep a log of your blood glucose results and always take it with you to your doctor appointments.  Keep a list of your current medications including injectables and over the counter medications and bring this medication list with you to all your doctor appointments.  If you have not seen your ophthalmologist this year call for appointment.  Check your feet daily for redness, sores, or openings. Do not self treat. If no improvement in two days call your primary care physician for an appointment.  Low blood sugar (hypoglycemia) is a blood sugar below 70mg/dl. Check your blood sugar if you feel signs/symptoms of hypoglycemia. If your blood sugar is below 70 take 15 grams of carbohydrates (ex 4 oz of apple juice, 3-4 glucose tablets, or 4-6 oz of regular soda) wait 15 minutes and repeat blood sugar to make sure it comes up above 70.  If your blood sugar is above 70 and you are due for a meal, have a meal.  If you are not due for a meal have a snack.  This snack helps keeps your blood sugar at a safe range.    Diagnosis: HTN (hypertension)  Assessment and Plan of Treatment: Blood Pressure Control , Please continue current medication regimen, and follow up with your PCP  - You have a history of Hypertension.   - Your Blood Pressure was adequately controlled with Losartan.   - You should continue on the current antihypertensive regimen regularly.  - You blood pressure should be within 140-120/80-90.  - You should follow-up with your PCP within 1 week of your discharge for routine blood pressure monitoring at your next visit.  - Notify your doctor if you have any of the following symptoms:   (Dizziness, Lightheadedness, Blurry vision, Headache, Chest pain, Shortness of breath.)  - You should maintain healthy lifestyle by eating healthy low salt diet, avoid fatty food, weight loss, exercise regularly as tolerated 30 mins X 3 time per week.     PRINCIPAL DISCHARGE DIAGNOSIS  Diagnosis: Pulmonary embolism  Assessment and Plan of Treatment: You presented to the hospital with shortness of breath. You were admitted for acute hypoxic respiratory failure secondary to bilateral pulmonary embolism (blood clot in the lungs). CT angio chest showed blood clots in the lungs. doppler US of your legs also showed blood clots in the right peroneal vein. You were seen by lung doctor Dr. Colvin. Your oxygen levels were low and you required oxygen supplementation. Your oxygenation improved and you were saturating well on room air. Echo showed Derived Variables:  LVMI: 83 g/m2  RWT: 0.40  Ejection Fraction Visual Estimate: >55 %  ------------------------------------------------------------------------  CONCLUSIONS:  TECHNICALLY VERY DIFFICULT STUDY, POOR ACOUSTIC WINDOWS  1. Normal left ventricular internal dimensions and wall  thicknesses.  2. Endocardium not well visualized; grossly normal left  ventricular systolic function based on limited views.  Segmental wall motion could not be assessed.  3. Right ventricle not well visualized. Probably normal  right ventricular size and systolic function.  ------------------------------------------------------------------------  . You were started on Eliquis 10 mg twice a day on 05/27. PLEASE CONTINUE TAKING ELIQUIS 5 MG 2 TABLETS TWICE A DAY UNTIL 06/02/2023. PLEASE, CONTINUE TAKING ELIQUIS 5 MG ONE TABLET TWICE A DAY THEREAFTER FOR 3 MONTHS UNTIL 08/31/2023. Please follow up with your primary care physician and Dr. Colvin in one week to inform them of your recent hospitalization and further management of your medical conditions.      SECONDARY DISCHARGE DIAGNOSES  Diagnosis: S/P abdominoplasty  Assessment and Plan of Treatment: You had abdominoplasty and brachioplasty bilateral on Monday. Your drains noted at the lower abdomen with minimal sanguinous drainage, the suture sites are intact without any discharges. You were seen by plastic surgeon who recommended conservative management and follow up outpatient. Please follow up with your primary care physician and plastic surgeon in one week to inform them of your recent hospitalization and further management of your medical conditions.      Diagnosis: DM (diabetes mellitus)  Assessment and Plan of Treatment: Maintaining blood glucose level within normal range.  - You have a history of diabetes  - Your HbA1c is 6.9%  - You should continue to take your medication regimen regularly as prescribed  - Please follow up with your primary care provider/endocrinologist within a week of discharge.  - You need to continue monitoring your blood sugar levels closely.  - Please maintain healthy lifestyle by eating healthy diabetic regimen, weight loss and exercise regularly as tolerated.  Make sure you get your HgA1c checked every three months.  check your blood glucose before meals and at bedtime.  It's important not to skip any meals.  Keep a log of your blood glucose results and always take it with you to your doctor appointments.  Keep a list of your current medications including injectables and over the counter medications and bring this medication list with you to all your doctor appointments.  If you have not seen your ophthalmologist this year call for appointment.  Check your feet daily for redness, sores, or openings. Do not self treat. If no improvement in two days call your primary care physician for an appointment.  Low blood sugar (hypoglycemia) is a blood sugar below 70mg/dl. Check your blood sugar if you feel signs/symptoms of hypoglycemia. If your blood sugar is below 70 take 15 grams of carbohydrates (ex 4 oz of apple juice, 3-4 glucose tablets, or 4-6 oz of regular soda) wait 15 minutes and repeat blood sugar to make sure it comes up above 70.  If your blood sugar is above 70 and you are due for a meal, have a meal.  If you are not due for a meal have a snack.  This snack helps keeps your blood sugar at a safe range.    Diagnosis: HTN (hypertension)  Assessment and Plan of Treatment: Blood Pressure Control , Please continue current medication regimen, and follow up with your PCP  - You have a history of Hypertension.   - Your Blood Pressure was adequately controlled with Losartan.   - You should continue on the current antihypertensive regimen regularly.  - You blood pressure should be within 140-120/80-90.  - You should follow-up with your PCP within 1 week of your discharge for routine blood pressure monitoring at your next visit.  - Notify your doctor if you have any of the following symptoms:   (Dizziness, Lightheadedness, Blurry vision, Headache, Chest pain, Shortness of breath.)  - You should maintain healthy lifestyle by eating healthy low salt diet, avoid fatty food, weight loss, exercise regularly as tolerated 30 mins X 3 time per week.

## 2023-05-27 NOTE — PROGRESS NOTE ADULT - PROBLEM SELECTOR PLAN 4
on Admelog and Lantus at home  recent HbA1C 6.9%  - c/w ISS   - adjust insulin according to blood glucose levels s/p abdominoplasty and brachioplasty bilateral on Monday    drains noted at the lower abdomen with minimal sanguinous drainage, the suture sites are intact without any discharges  - Miralax and Senna PRN for constipation  - Lozenges for sore throat [general anesthesia was done for surgery]  - Plastic surgeon consulted recommended conservative management and follow up outpatient

## 2023-05-27 NOTE — DIETITIAN INITIAL EVALUATION ADULT - PROBLEM SELECTOR PLAN 4
on Admelog and Lantus at home  recent HbA1C 6.9%  - c/w ISS   - adjust insulin according to blood glucose levels

## 2023-05-27 NOTE — DISCHARGE NOTE PROVIDER - CARE PROVIDER_API CALL
Kamryn Colvin  Pulmonary Disease  [address] [facility], [address]  [place] [address]  Phone: [phone]  Fax: [phone]  Follow Up Time: 1 week    Vaughn Khanna  Plastic Surgery  [address], [address]  [place] [address]  Phone: [phone]  Fax: [phone]  Follow Up Time: 1 week

## 2023-05-27 NOTE — PROGRESS NOTE ADULT - PROBLEM SELECTOR PLAN 6
- DVT: s/p Lovenox 80 mg in ED on Friday 5/26, to start Eliquis 10 mg BID for 7 days from 5/27 for PE pmhx HTN on Losartan  - will hold meds for now  - monitor BP  - will consider initiating home med if BP>140/90

## 2023-05-27 NOTE — PROGRESS NOTE ADULT - SUBJECTIVE AND OBJECTIVE BOX
PGY-1 Progress Note discussed with attending    PAGER #: [[phone]] TILL 5:00 PM  PLEASE CONTACT ON CALL TEAM:  - On Call Team (Please refer to Unique) FROM 5:00 PM - 8:30PM  - Nightfloat Team FROM 8:30 -7:30 AM    CHIEF COMPLAINT & BRIEF HOSPITAL COURSE:    INTERVAL HPI/OVERNIGHT EVENTS:       REVIEW OF SYSTEMS:  CONSTITUTIONAL: No fever, weight loss, or fatigue  RESPIRATORY: No cough, wheezing, chills or hemoptysis; No shortness of breath  CARDIOVASCULAR: No chest pain, palpitations, dizziness, or leg swelling  GASTROINTESTINAL: No abdominal pain. No nausea, vomiting, or hematemesis; No diarrhea or constipation. No melena or hematochezia.  GENITOURINARY: No dysuria or hematuria, urinary frequency  NEUROLOGICAL: No headaches, memory loss, loss of strength, numbness, or tremors  SKIN: No itching, burning, rashes, or lesions     Vital Signs Last 24 Hrs  T(C): 37.5 (27 May 2023 05:16), Max: 38 (27 May 2023 01:04)  T(F): 99.5 (27 May 2023 05:16), Max: 100.4 (27 May 2023 01:04)  HR: 109 (27 May 2023 05:16) (105 - 120)  BP: 126/73 (27 May 2023 05:16) (126/73 - 139/63)  BP(mean): --  RR: 18 (27 May 2023 05:16) (18 - 22)  SpO2: 95% (27 May 2023 05:16) (92% - 96%)    Parameters below as of 27 May 2023 05:16  Patient On (Oxygen Delivery Method): nasal cannula  O2 Flow (L/min): 4      PHYSICAL EXAMINATION:  GENERAL: NAD, well built  HEAD:  Atraumatic, Normocephalic  EYES:  conjunctiva and sclera clear  NECK: Supple, No JVD, Normal thyroid  CHEST/LUNG: Clear to auscultation. Clear to percussion bilaterally; No rales, rhonchi, wheezing, or rubs  HEART: Regular rate and rhythm; No murmurs, rubs, or gallops  ABDOMEN: Soft, Nontender, Nondistended; Bowel sounds present  NERVOUS SYSTEM:  Alert & Oriented X3,    EXTREMITIES:  2+ Peripheral Pulses, No clubbing, cyanosis, or edema  SKIN: warm dry                          11.0   14.18 )-----------( 279      ( 27 May 2023 06:10 )             33.7     05-27    140  |  109<H>  |  13  ----------------------------<  280<H>  4.0   |  22  |  0.63    Ca    9.0      27 May 2023 06:10  Phos  3.4     05-27  Mg     2.3     05-27    TPro  6.9  /  Alb  2.7<L>  /  TBili  0.7  /  DBili  x   /  AST  9<L>  /  ALT  20  /  AlkPhos  71  05-26    LIVER FUNCTIONS - ( 26 May 2023 11:15 )  Alb: 2.7 g/dL / Pro: 6.9 g/dL / ALK PHOS: 71 U/L / ALT: 20 U/L DA / AST: 9 U/L / GGT: x                   CAPILLARY BLOOD GLUCOSE      RADIOLOGY & ADDITIONAL TESTS:                   PGY-1 Progress Note discussed with attending    PAGER #: [[phone]] TILL 5:00 PM  PLEASE CONTACT ON CALL TEAM:  - On Call Team (Please refer to Unique) FROM 5:00 PM - 8:30PM  - Nightfloat Team FROM 8:30 -7:30 AM    INTERVAL HPI/OVERNIGHT EVENTS:   overnight, pt had a fever of 100.4. pt. seen and examined at bedside. She states that she feels much better than yesterday but still feels short of breath without the oxygen supplementation       REVIEW OF SYSTEMS:  CONSTITUTIONAL: + fever, No weight loss, + fatigue and generalized weakness  RESPIRATORY: No cough, wheezing, chills or hemoptysis; + shortness of breath  CARDIOVASCULAR: No chest pain, palpitations, dizziness, or leg swelling  GASTROINTESTINAL: No abdominal pain. No nausea, vomiting, or hematemesis; No diarrhea or constipation. No melena or hematochezia.  GENITOURINARY: No dysuria or hematuria, urinary frequency  NEUROLOGICAL: No headaches, memory loss, loss of strength, numbness, or tremors  SKIN: No itching, burning, rashes, or lesions     Vital Signs Last 24 Hrs  T(C): 37.5 (27 May 2023 05:16), Max: 38 (27 May 2023 01:04)  T(F): 99.5 (27 May 2023 05:16), Max: 100.4 (27 May 2023 01:04)  HR: 109 (27 May 2023 05:16) (105 - 120)  BP: 126/73 (27 May 2023 05:16) (126/73 - 139/63)  BP(mean): --  RR: 18 (27 May 2023 05:16) (18 - 22)  SpO2: 95% (27 May 2023 05:16) (92% - 96%)    Parameters below as of 27 May 2023 05:16  Patient On (Oxygen Delivery Method): nasal cannula  O2 Flow (L/min): 4      PHYSICAL EXAMINATION:  GENERAL: NAD, lying in bed comfortably, saturating well on 4 LNC  HEAD:  Atraumatic, Normocephalic  EYES: EOMI, PERRLA, conjunctiva and sclera clear  ENT: Moist mucous membranes  NECK: Supple, No JVD  CHEST/LUNG: Clear to auscultation bilaterally; +  rhonchi bilateral lower lungs Rt>Lt , No wheezing, or rubs. Unlabored respirations  HEART: Regular rate and rhythm; No murmurs, rubs, or gallops  ABDOMEN: Bowel sounds present; Soft, Nondistended. + tender on the lower abdomen at the site of surgery, 2 drains in place with minimal sanguinous drainage, surgical site intact without any discharges   EXTREMITIES:  2+ Peripheral Pulses, brisk capillary refill. No clubbing, cyanosis, or edema  NERVOUS SYSTEM:  Alert & Oriented X3, speech clear. No deficits   MSK: FROM all 4 extremities, full and equal strength  SKIN: surgical sites without discharge and intact suture noted on the lower abdomen                 11.0   14.18 )-----------( 279      ( 27 May 2023 06:10 )             33.7     05-27    140  |  109<H>  |  13  ----------------------------<  280<H>  4.0   |  22  |  0.63    Ca    9.0      27 May 2023 06:10  Phos  3.4     05-27  Mg     2.3     05-27    TPro  6.9  /  Alb  2.7<L>  /  TBili  0.7  /  DBili  x   /  AST  9<L>  /  ALT  20  /  AlkPhos  71  05-26    LIVER FUNCTIONS - ( 26 May 2023 11:15 )  Alb: 2.7 g/dL / Pro: 6.9 g/dL / ALK PHOS: 71 U/L / ALT: 20 U/L DA / AST: 9 U/L / GGT: x                   CAPILLARY BLOOD GLUCOSE      RADIOLOGY & ADDITIONAL TESTS:      < from: US Duplex Venous Lower Ext Complete, Bilateral (05.26.23 @ 17:20) >    ACC: [id number] EXAM:  US DPLX LWR EXT VEINS COMPL BI   ORDERED BY: JOSÉ CASTILLO     PROCEDURE DATE:  05/26/2023          INTERPRETATION:  CLINICAL INFORMATION: dyspnea s/p surgery    COMPARISON: None available.    TECHNIQUE: Duplex sonography of the BILATERAL LOWER extremity veins with   color and spectral Doppler, with and without compression.    FINDINGS:    RIGHT:  Normal compressibility of the RIGHT common femoral, femoral and popliteal   veins.  Doppler examination shows normal spontaneousand phasic flow.  Occlusive DVT within the right peroneal vein..    LEFT:  Normal compressibility of the LEFT common femoral, femoral and popliteal   veins.  Doppler examination shows normal spontaneous and phasic flow.  No LEFT calf vein thrombosis is detected.    IMPRESSION:  Occlusive DVT within the right peroneal vein.    No other evidence DVT within the right or left lower extremity otherwise   present.    Dr Castillo notified of the findings by the ultrasound technologist Hope Carr [id number] hours on May 26, 2023.    --- End of Report ---             RICKY REY MD; Attending Radiologist  This document has been electronically signed. May 26 2023  5:47PM    < end of copied text >    < from: CT Angio Chest PE Protocol w/ IV Cont (05.26.23 @ 13:11) >    ACC: [id number] EXAM:  CT [facility]GIO CHEST [facility]   ORDERED BY: ALICIA THORNTON     PROCEDURE DATE:  05/26/2023          INTERPRETATION:  CTA CHEST    INDICATION: Difficulty breathing. Evaluate for pulmonary embolus.    TECHNIQUE: Enhanced helical images were obtained of the chest. Coronal   and sagittal images were reconstructed.  Images were obtained after the   uneventful administration of 50 cc of nonionic intravenous contrast   (Omnipaque 350). 50 cc of nonionic intravenous contrast (Omnipaque 350)   was discarded. Maximum intensity projection images were generated.    COMPARISON: Radiograph chest 7/8/2017.    FINDINGS:    Pulmonary Artery:  Bilateral segmental pulmonary emboli involve the   superior and inferior lingular artery and the apical segmental artery of   the right upper lobe. No other emboli are shown.    Tubes/Lines: None.    Lungs, airways and pleura: Left lower lobe linear atelectasis. Right   middle and right lower lobe linear atelectasis.    The right diaphragm is elevated. There are secretions within the bronchus   intermedius, right middle and right lower lobe bronchi, and the segmental   bronchi of the right middle and right lower lobes.    No pneumothorax. No pleural effusion.    Mediastinum: The visualized thyroid gland is normal. The upper and lower   paratracheal and subcarinal lymph nodes measure less than 10 mm the short   axis. The esophagus is unremarkable.    The heart is normal in size. The aorta is normal in caliber.    Upper Abdomen: Arising from the left adrenal gland is a 3.0 x 2.6 cm   adenoma.    Bones And Soft Tissues: The bones are unremarkable.  The soft tissues are   unremarkable.      IMPRESSION:    1.  Bilateral lingular and right upper lobe segmental pulmonary emboli.  2.  Elevated right diaphragm with right middle and right lower lobe   linear atelectasis.  3.  Secretions within the bronchus intermedius, right middle and right   lower lobe bronchus, and the segmental bronchi of the right middle and   right lower lobe.  4.  The findings were discussed with and read back verification obtained   from Dr. Thornton on 5/26/2022 at 1353 hours.    --- End of Report ---            USHA ROLDAN MD; Attending Radiologist  This document has been electronically signed. May 26 2023  1:53PM    < end of copied text >

## 2023-05-27 NOTE — PROGRESS NOTE ADULT - ATTENDING COMMENTS
47F DM, HTN, s/p anbdominoplasty, and b/l arm liposuction on 5/22, p/w cc SOB, found with b/l PE.    AHRF  B/L PE  leukocytosis  s/p abdominoplasty and liposuction  DM  HTN    -wean O2 as tolerated  -obtain TTE  -c/w tele  -c/w doac  -appreciate pulm input  -fever and leukocytosis suspect from PE, no imaging evidence of pna, monitor off abx  -bowel regimen  -iss  -holding home bp meds as pt normotensive  -dvt ppx: doac

## 2023-05-27 NOTE — DIETITIAN INITIAL EVALUATION ADULT - FLUID ACCUMULATION
no edema  Melolabial Interpolation Flap Text: A decision was made to reconstruct the defect utilizing an interpolation axial flap and a staged reconstruction.  A telfa template was made of the defect.  This telfa template was then used to outline the melolabial interpolation flap.  The donor area for the pedicle flap was then injected with anesthesia.  The flap was excised through the skin and subcutaneous tissue down to the layer of the underlying musculature.  The pedicle flap was carefully excised within this deep plane to maintain its blood supply.  The edges of the donor site were undermined.   The donor site was closed in a primary fashion.  The pedicle was then rotated into position and sutured.  Once the tube was sutured into place, adequate blood supply was confirmed with blanching and refill.  The pedicle was then wrapped with xeroform gauze and dressed appropriately with a telfa and gauze bandage to ensure continued blood supply and protect the attached pedicle.

## 2023-05-27 NOTE — DISCHARGE NOTE PROVIDER - PROVIDER TOKENS
PROVIDER:[TOKEN:[[id number]],FOLLOWUP:[1 week]],PROVIDER:[TOKEN:[[id number]],FOLLOWUP:[1 week]]

## 2023-05-27 NOTE — DISCHARGE NOTE PROVIDER - NSDCMRMEDTOKEN_GEN_ALL_CORE_FT
Admelog 100 units/mL injectable solution: 6 unit(s) injectable 2 times a day in the morning and after dinner, up to 8 units 2 times a day in the morning and after dinner  (based on blood sugar check, hold if 120 or lower)  cyclobenzaprine 10 mg oral tablet: 1 tab(s) orally 3 times a day  Eliquis 5 mg oral tablet: 1 tab(s) orally 2 times a day  Eliquis 5 mg oral tablet: 2 tab(s) orally 2 times a day  Lantus 100 units/mL subcutaneous solution: 15 unit(s) subcutaneous once a day at night up to 20 units once a day at night (based on blood sugar check)  losartan 50 mg oral tablet: 1 tab(s) orally 2 times a day

## 2023-05-27 NOTE — PROGRESS NOTE ADULT - PROBLEM SELECTOR PLAN 3
s/p abdominoplasty and brachioplasty bilateral on Monday    drains noted at the lower abdomen with minimal sanguinous drainage, the suture sites are intact without any discharges  - Miralax and Senna PRN for constipation  - Lozenges for sore throat [general anesthesia was done for surgery] Pt developed WBC of 9 > 14  Pt had a fever of 100.4 on 05/27  likely due to PE and DVT  monitor for now  consider xray and infectious workup if wbc is still increasing

## 2023-05-27 NOTE — DIETITIAN INITIAL EVALUATION ADULT - OTHER INFO
Patient from home lives with family admitted for acute hypoxic respiratory failure secondary to bilateral pulmonary embolism. Visited pt. alert & awake, reports in "mild pain" & tolerated ~40% of breakfast meals, states recently had "tummy tuck with liposuction to arms" on Monday (05/22/23) experienced increase "SOB with abdominal pain" therefore with decrease oral intake, last recalled weighted at 190 Lbs at "clinic", possible wt. loss, unsure of amounts? & denies h/o "bariatric surgery", dosing wt. 182.1 Lbs on 05/26/23 noted. Pt. declined nutritional supplement stated "unable to tolerate" & provided "few food choices" updated kitchen/Diet Tech, encourage po intake w/meal set up. Presently pt. not interested in nutrition education & followed by Pulmonary/team.

## 2023-05-27 NOTE — DIETITIAN INITIAL EVALUATION ADULT - PERTINENT MEDS FT
MEDICATIONS  (STANDING):  apixaban 10 milliGRAM(s) Oral every 12 hours  guaiFENesin  milliGRAM(s) Oral every 12 hours  insulin lispro (ADMELOG) corrective regimen sliding scale   SubCutaneous at bedtime  insulin lispro (ADMELOG) corrective regimen sliding scale   SubCutaneous three times a day before meals    MEDICATIONS  (PRN):  polyethylene glycol 3350 17 Gram(s) Oral daily PRN Constipation  senna 2 Tablet(s) Oral at bedtime PRN Constipation

## 2023-05-27 NOTE — DIETITIAN INITIAL EVALUATION ADULT - ORAL INTAKE PTA/DIET HISTORY
Per pt. consumes 3 meals daily & follow kosher dietary restrictions  Avoids sugars/sweets/sodas & salt

## 2023-05-28 ENCOUNTER — TRANSCRIPTION ENCOUNTER (OUTPATIENT)
Age: 48
End: 2023-05-28

## 2023-05-28 VITALS
TEMPERATURE: 99 F | HEART RATE: 100 BPM | SYSTOLIC BLOOD PRESSURE: 143 MMHG | RESPIRATION RATE: 17 BRPM | OXYGEN SATURATION: 95 % | DIASTOLIC BLOOD PRESSURE: 66 MMHG

## 2023-05-28 LAB
ALBUMIN SERPL ELPH-MCNC: 2.7 G/DL — LOW (ref 3.5–5)
ALP SERPL-CCNC: 78 U/L — SIGNIFICANT CHANGE UP (ref 40–120)
ALT FLD-CCNC: 22 U/L DA — SIGNIFICANT CHANGE UP (ref 10–60)
ANION GAP SERPL CALC-SCNC: 4 MMOL/L — LOW (ref 5–17)
AST SERPL-CCNC: 8 U/L — LOW (ref 10–40)
BASOPHILS # BLD AUTO: 0.03 K/UL — SIGNIFICANT CHANGE UP (ref 0–0.2)
BASOPHILS NFR BLD AUTO: 0.3 % — SIGNIFICANT CHANGE UP (ref 0–2)
BILIRUB SERPL-MCNC: 0.5 MG/DL — SIGNIFICANT CHANGE UP (ref 0.2–1.2)
BUN SERPL-MCNC: 16 MG/DL — SIGNIFICANT CHANGE UP (ref 7–18)
CALCIUM SERPL-MCNC: 9 MG/DL — SIGNIFICANT CHANGE UP (ref 8.4–10.5)
CHLORIDE SERPL-SCNC: 112 MMOL/L — HIGH (ref 96–108)
CO2 SERPL-SCNC: 24 MMOL/L — SIGNIFICANT CHANGE UP (ref 22–31)
CREAT SERPL-MCNC: 0.72 MG/DL — SIGNIFICANT CHANGE UP (ref 0.5–1.3)
EGFR: 104 ML/MIN/1.73M2 — SIGNIFICANT CHANGE UP
EOSINOPHIL # BLD AUTO: 0.22 K/UL — SIGNIFICANT CHANGE UP (ref 0–0.5)
EOSINOPHIL NFR BLD AUTO: 2.5 % — SIGNIFICANT CHANGE UP (ref 0–6)
GLUCOSE BLDC GLUCOMTR-MCNC: 261 MG/DL — HIGH (ref 70–99)
GLUCOSE BLDC GLUCOMTR-MCNC: 343 MG/DL — HIGH (ref 70–99)
GLUCOSE SERPL-MCNC: 251 MG/DL — HIGH (ref 70–99)
HCT VFR BLD CALC: 34.1 % — LOW (ref 34.5–45)
HGB BLD-MCNC: 11.2 G/DL — LOW (ref 11.5–15.5)
IMM GRANULOCYTES NFR BLD AUTO: 0.8 % — SIGNIFICANT CHANGE UP (ref 0–0.9)
LYMPHOCYTES # BLD AUTO: 2.43 K/UL — SIGNIFICANT CHANGE UP (ref 1–3.3)
LYMPHOCYTES # BLD AUTO: 27.7 % — SIGNIFICANT CHANGE UP (ref 13–44)
MAGNESIUM SERPL-MCNC: 2.2 MG/DL — SIGNIFICANT CHANGE UP (ref 1.6–2.6)
MCHC RBC-ENTMCNC: 31 PG — SIGNIFICANT CHANGE UP (ref 27–34)
MCHC RBC-ENTMCNC: 32.8 GM/DL — SIGNIFICANT CHANGE UP (ref 32–36)
MCV RBC AUTO: 94.5 FL — SIGNIFICANT CHANGE UP (ref 80–100)
MONOCYTES # BLD AUTO: 0.53 K/UL — SIGNIFICANT CHANGE UP (ref 0–0.9)
MONOCYTES NFR BLD AUTO: 6.1 % — SIGNIFICANT CHANGE UP (ref 2–14)
NEUTROPHILS # BLD AUTO: 5.48 K/UL — SIGNIFICANT CHANGE UP (ref 1.8–7.4)
NEUTROPHILS NFR BLD AUTO: 62.6 % — SIGNIFICANT CHANGE UP (ref 43–77)
NRBC # BLD: 0 /100 WBCS — SIGNIFICANT CHANGE UP (ref 0–0)
PHOSPHATE SERPL-MCNC: 4.5 MG/DL — SIGNIFICANT CHANGE UP (ref 2.5–4.5)
PLATELET # BLD AUTO: 283 K/UL — SIGNIFICANT CHANGE UP (ref 150–400)
POTASSIUM SERPL-MCNC: 3.8 MMOL/L — SIGNIFICANT CHANGE UP (ref 3.5–5.3)
POTASSIUM SERPL-SCNC: 3.8 MMOL/L — SIGNIFICANT CHANGE UP (ref 3.5–5.3)
PROT SERPL-MCNC: 6.8 G/DL — SIGNIFICANT CHANGE UP (ref 6–8.3)
RBC # BLD: 3.61 M/UL — LOW (ref 3.8–5.2)
RBC # FLD: 12.2 % — SIGNIFICANT CHANGE UP (ref 10.3–14.5)
SODIUM SERPL-SCNC: 140 MMOL/L — SIGNIFICANT CHANGE UP (ref 135–145)
WBC # BLD: 8.76 K/UL — SIGNIFICANT CHANGE UP (ref 3.8–10.5)
WBC # FLD AUTO: 8.76 K/UL — SIGNIFICANT CHANGE UP (ref 3.8–10.5)

## 2023-05-28 PROCEDURE — 93970 EXTREMITY STUDY: CPT

## 2023-05-28 PROCEDURE — 93005 ELECTROCARDIOGRAM TRACING: CPT

## 2023-05-28 PROCEDURE — 80053 COMPREHEN METABOLIC PANEL: CPT

## 2023-05-28 PROCEDURE — 84100 ASSAY OF PHOSPHORUS: CPT

## 2023-05-28 PROCEDURE — 36415 COLL VENOUS BLD VENIPUNCTURE: CPT

## 2023-05-28 PROCEDURE — 85027 COMPLETE CBC AUTOMATED: CPT

## 2023-05-28 PROCEDURE — 83735 ASSAY OF MAGNESIUM: CPT

## 2023-05-28 PROCEDURE — 71275 CT ANGIOGRAPHY CHEST: CPT | Mod: MA

## 2023-05-28 PROCEDURE — 80048 BASIC METABOLIC PNL TOTAL CA: CPT

## 2023-05-28 PROCEDURE — 99285 EMERGENCY DEPT VISIT HI MDM: CPT

## 2023-05-28 PROCEDURE — 84484 ASSAY OF TROPONIN QUANT: CPT

## 2023-05-28 PROCEDURE — 82962 GLUCOSE BLOOD TEST: CPT

## 2023-05-28 PROCEDURE — 99239 HOSP IP/OBS DSCHRG MGMT >30: CPT

## 2023-05-28 PROCEDURE — 85025 COMPLETE CBC W/AUTO DIFF WBC: CPT

## 2023-05-28 PROCEDURE — 84702 CHORIONIC GONADOTROPIN TEST: CPT

## 2023-05-28 PROCEDURE — 93306 TTE W/DOPPLER COMPLETE: CPT

## 2023-05-28 PROCEDURE — 83880 ASSAY OF NATRIURETIC PEPTIDE: CPT

## 2023-05-28 PROCEDURE — 87637 SARSCOV2&INF A&B&RSV AMP PRB: CPT

## 2023-05-28 RX ADMIN — Medication 4: at 11:30

## 2023-05-28 RX ADMIN — APIXABAN 10 MILLIGRAM(S): 2.5 TABLET, FILM COATED ORAL at 02:08

## 2023-05-28 RX ADMIN — Medication 3: at 08:10

## 2023-05-28 RX ADMIN — Medication 600 MILLIGRAM(S): at 05:50

## 2023-05-28 NOTE — DISCHARGE NOTE NURSING/CASE MANAGEMENT/SOCIAL WORK - PATIENT PORTAL LINK FT
You can access the FollowMyHealth Patient Portal offered by [facility] by registering at the following website: [email or web address]. By joining [employer]’s FollowMyHealth portal, you will also be able to view your health information using other applications (apps) compatible with our system.

## 2023-05-28 NOTE — DISCHARGE NOTE NURSING/CASE MANAGEMENT/SOCIAL WORK - NSDCPEFALRISK_GEN_ALL_CORE
For information on Fall & Injury Prevention, visit: [email or web address] OR  [email or web address] OR  https://www.cdc.gov/steadi/patient.html

## 2023-06-01 ENCOUNTER — NON-APPOINTMENT (OUTPATIENT)
Age: 48
End: 2023-06-01

## 2023-06-24 PROBLEM — I10 ESSENTIAL (PRIMARY) HYPERTENSION: Chronic | Status: ACTIVE | Noted: 2023-05-26

## 2023-06-30 ENCOUNTER — APPOINTMENT (OUTPATIENT)
Dept: WOUND CARE | Facility: HOSPITAL | Age: 48
End: 2023-06-30

## 2024-05-18 NOTE — H&P ADULT - PROBLEM SELECTOR PLAN 4
You can access the FollowMyHealth Patient Portal offered by [facility] by registering at the following website: [email or web address]. By joining [employer]’s FollowMyHealth portal, you will also be able to view your health information using other applications (apps) compatible with our system.
on Admelog and Lantus at home  recent HbA1C 6.9%  - c/w ISS   - adjust insulin according to blood glucose levels

## 2025-06-22 NOTE — ED PROVIDER NOTE - GASTROINTESTINAL NEGATIVE STATEMENT, MLM
Bed: 11  Expected date:   Expected time:   Means of arrival:   Comments:  LAWRENCE SILVERIO   no abdominal pain, no bloating, no constipation, no diarrhea, no nausea and no vomiting.